# Patient Record
Sex: MALE | Race: ASIAN | Employment: OTHER | URBAN - METROPOLITAN AREA
[De-identification: names, ages, dates, MRNs, and addresses within clinical notes are randomized per-mention and may not be internally consistent; named-entity substitution may affect disease eponyms.]

---

## 2018-05-30 ENCOUNTER — HOSPITAL ENCOUNTER (OUTPATIENT)
Dept: LAB | Age: 83
Discharge: HOME | End: 2018-05-30

## 2018-05-30 PROCEDURE — 88305 TISSUE EXAM BY PATHOLOGIST: CPT | Performed by: INTERNAL MEDICINE

## 2018-10-16 PROBLEM — E11.21 TYPE 2 DIABETES WITH NEPHROPATHY (HCC): Status: ACTIVE | Noted: 2018-10-16

## 2018-12-19 ENCOUNTER — HOSPITAL ENCOUNTER (OUTPATIENT)
Dept: GENERAL RADIOLOGY | Age: 83
Discharge: HOME | End: 2018-12-19
Attending: NURSE PRACTITIONER
Payer: MEDICARE

## 2018-12-19 DIAGNOSIS — M54.50 LUMBAR PAIN: ICD-10-CM

## 2018-12-19 PROCEDURE — 72110 X-RAY EXAM L-2 SPINE 4/>VWS: CPT

## 2018-12-19 NOTE — PROGRESS NOTES
X-ray shows Moderate degenerative change without significant difference when compared to the prior exam of September 2014

## 2019-11-22 ENCOUNTER — HOSPITAL ENCOUNTER (OUTPATIENT)
Dept: MRI IMAGING | Age: 84
Discharge: HOME | End: 2019-11-22
Attending: PSYCHIATRY & NEUROLOGY
Payer: MEDICARE

## 2019-11-22 DIAGNOSIS — G91.2 NPH (NORMAL PRESSURE HYDROCEPHALUS) (HCC): ICD-10-CM

## 2019-11-22 DIAGNOSIS — M48.062 SPINAL STENOSIS OF LUMBAR REGION WITH NEUROGENIC CLAUDICATION: ICD-10-CM

## 2019-11-22 PROCEDURE — 70553 MRI BRAIN STEM W/O & W/DYE: CPT

## 2019-11-22 PROCEDURE — A9575 INJ GADOTERATE MEGLUMI 0.1ML: HCPCS | Performed by: PSYCHIATRY & NEUROLOGY

## 2019-11-22 PROCEDURE — 74011250636 HC RX REV CODE- 250/636: Performed by: PSYCHIATRY & NEUROLOGY

## 2019-11-22 PROCEDURE — 72148 MRI LUMBAR SPINE W/O DYE: CPT

## 2019-11-22 RX ORDER — GADOTERATE MEGLUMINE 376.9 MG/ML
15 INJECTION INTRAVENOUS
Status: COMPLETED | OUTPATIENT
Start: 2019-11-22 | End: 2019-11-22

## 2019-11-22 RX ORDER — SODIUM CHLORIDE 0.9 % (FLUSH) 0.9 %
10 SYRINGE (ML) INJECTION
Status: COMPLETED | OUTPATIENT
Start: 2019-11-22 | End: 2019-11-22

## 2019-11-22 RX ADMIN — GADOTERATE MEGLUMINE 15 ML: 376.9 INJECTION INTRAVENOUS at 16:08

## 2019-11-22 RX ADMIN — Medication 10 ML: at 16:07

## 2019-12-13 ENCOUNTER — HOSPITAL ENCOUNTER (OUTPATIENT)
Dept: CT IMAGING | Age: 84
Discharge: HOME | End: 2019-12-13
Attending: FAMILY MEDICINE
Payer: MEDICARE

## 2019-12-13 ENCOUNTER — HOSPITAL ENCOUNTER (OUTPATIENT)
Dept: GENERAL RADIOLOGY | Age: 84
Discharge: HOME | End: 2019-12-13
Attending: FAMILY MEDICINE
Payer: MEDICARE

## 2019-12-13 DIAGNOSIS — M54.50 ACUTE MIDLINE LOW BACK PAIN WITHOUT SCIATICA: ICD-10-CM

## 2019-12-13 DIAGNOSIS — S09.90XA INJURY OF HEAD, INITIAL ENCOUNTER: ICD-10-CM

## 2019-12-13 PROCEDURE — 72100 X-RAY EXAM L-S SPINE 2/3 VWS: CPT

## 2019-12-13 PROCEDURE — 70450 CT HEAD/BRAIN W/O DYE: CPT

## 2020-01-16 PROBLEM — M21.372 FOOT DROP, BILATERAL: Status: ACTIVE | Noted: 2020-01-16

## 2020-01-16 PROBLEM — M21.371 FOOT DROP, BILATERAL: Status: ACTIVE | Noted: 2020-01-16

## 2020-01-16 PROBLEM — M54.50 CHRONIC BILATERAL LOW BACK PAIN: Status: ACTIVE | Noted: 2020-01-16

## 2020-01-16 PROBLEM — R29.898 WEAKNESS OF BOTH LEGS: Status: ACTIVE | Noted: 2020-01-16

## 2020-01-16 PROBLEM — R20.2 NUMBNESS AND TINGLING OF BOTH FEET: Status: ACTIVE | Noted: 2020-01-16

## 2020-01-16 PROBLEM — G89.29 CHRONIC BILATERAL LOW BACK PAIN: Status: ACTIVE | Noted: 2020-01-16

## 2020-01-16 PROBLEM — R20.0 NUMBNESS AND TINGLING OF BOTH FEET: Status: ACTIVE | Noted: 2020-01-16

## 2020-01-26 ENCOUNTER — HOSPITAL ENCOUNTER (INPATIENT)
Age: 85
LOS: 1 days | Discharge: HOME HEALTH | DRG: 069 | End: 2020-01-28
Attending: EMERGENCY MEDICINE | Admitting: INTERNAL MEDICINE
Payer: MEDICARE

## 2020-01-26 ENCOUNTER — APPOINTMENT (OUTPATIENT)
Dept: CT IMAGING | Age: 85
DRG: 069 | End: 2020-01-26
Attending: EMERGENCY MEDICINE
Payer: MEDICARE

## 2020-01-26 DIAGNOSIS — G45.9 TIA (TRANSIENT ISCHEMIC ATTACK): Primary | ICD-10-CM

## 2020-01-26 PROBLEM — I25.10 CAD (CORONARY ARTERY DISEASE): Chronic | Status: ACTIVE | Noted: 2020-01-26

## 2020-01-26 PROBLEM — R41.3 MEMORY LOSS: Chronic | Status: ACTIVE | Noted: 2020-01-26

## 2020-01-26 LAB
ALBUMIN SERPL-MCNC: 3.4 G/DL (ref 3.2–4.6)
ALBUMIN/GLOB SERPL: 0.8 {RATIO} (ref 1.2–3.5)
ALP SERPL-CCNC: 77 U/L (ref 50–136)
ALT SERPL-CCNC: 27 U/L (ref 12–65)
ANION GAP SERPL CALC-SCNC: 2 MMOL/L (ref 7–16)
AST SERPL-CCNC: 21 U/L (ref 15–37)
ATRIAL RATE: 71 BPM
BASOPHILS # BLD: 0 K/UL (ref 0–0.2)
BASOPHILS NFR BLD: 0 % (ref 0–2)
BILIRUB SERPL-MCNC: 0.5 MG/DL (ref 0.2–1.1)
BUN SERPL-MCNC: 13 MG/DL (ref 8–23)
CALCIUM SERPL-MCNC: 9.7 MG/DL (ref 8.3–10.4)
CALCULATED P AXIS, ECG09: 50 DEGREES
CALCULATED R AXIS, ECG10: -8 DEGREES
CALCULATED T AXIS, ECG11: 46 DEGREES
CHLORIDE SERPL-SCNC: 105 MMOL/L (ref 98–107)
CO2 SERPL-SCNC: 30 MMOL/L (ref 21–32)
CREAT SERPL-MCNC: 0.93 MG/DL (ref 0.8–1.5)
CRP SERPL-MCNC: <0.3 MG/DL (ref 0–0.9)
DIAGNOSIS, 93000: NORMAL
DIFFERENTIAL METHOD BLD: ABNORMAL
EOSINOPHIL # BLD: 0.1 K/UL (ref 0–0.8)
EOSINOPHIL NFR BLD: 1 % (ref 0.5–7.8)
ERYTHROCYTE [DISTWIDTH] IN BLOOD BY AUTOMATED COUNT: 13.1 % (ref 11.9–14.6)
EST. AVERAGE GLUCOSE BLD GHB EST-MCNC: 151 MG/DL
GLOBULIN SER CALC-MCNC: 4.1 G/DL (ref 2.3–3.5)
GLUCOSE BLD STRIP.AUTO-MCNC: 202 MG/DL (ref 65–100)
GLUCOSE SERPL-MCNC: 176 MG/DL (ref 65–100)
HBA1C MFR BLD: 6.9 %
HCT VFR BLD AUTO: 43.6 % (ref 41.1–50.3)
HGB BLD-MCNC: 15.4 G/DL (ref 13.6–17.2)
IMM GRANULOCYTES # BLD AUTO: 0 K/UL (ref 0–0.5)
IMM GRANULOCYTES NFR BLD AUTO: 0 % (ref 0–5)
LYMPHOCYTES # BLD: 3.2 K/UL (ref 0.5–4.6)
LYMPHOCYTES NFR BLD: 36 % (ref 13–44)
MCH RBC QN AUTO: 30.9 PG (ref 26.1–32.9)
MCHC RBC AUTO-ENTMCNC: 35.3 G/DL (ref 31.4–35)
MCV RBC AUTO: 87.4 FL (ref 79.6–97.8)
MONOCYTES # BLD: 0.5 K/UL (ref 0.1–1.3)
MONOCYTES NFR BLD: 5 % (ref 4–12)
NEUTS SEG # BLD: 5.2 K/UL (ref 1.7–8.2)
NEUTS SEG NFR BLD: 58 % (ref 43–78)
NRBC # BLD: 0 K/UL (ref 0–0.2)
P-R INTERVAL, ECG05: 148 MS
PLATELET # BLD AUTO: 193 K/UL (ref 150–450)
PMV BLD AUTO: 10.5 FL (ref 9.4–12.3)
POTASSIUM SERPL-SCNC: 4.1 MMOL/L (ref 3.5–5.1)
PROT SERPL-MCNC: 7.5 G/DL (ref 6.3–8.2)
Q-T INTERVAL, ECG07: 426 MS
QRS DURATION, ECG06: 130 MS
QTC CALCULATION (BEZET), ECG08: 462 MS
RBC # BLD AUTO: 4.99 M/UL (ref 4.23–5.6)
SODIUM SERPL-SCNC: 137 MMOL/L (ref 136–145)
TROPONIN I BLD-MCNC: 0 NG/ML (ref 0.02–0.05)
VENTRICULAR RATE, ECG03: 71 BPM
WBC # BLD AUTO: 8.9 K/UL (ref 4.3–11.1)

## 2020-01-26 PROCEDURE — 99283 EMERGENCY DEPT VISIT LOW MDM: CPT

## 2020-01-26 PROCEDURE — 82962 GLUCOSE BLOOD TEST: CPT

## 2020-01-26 PROCEDURE — 81001 URINALYSIS AUTO W/SCOPE: CPT

## 2020-01-26 PROCEDURE — 80053 COMPREHEN METABOLIC PANEL: CPT

## 2020-01-26 PROCEDURE — 74011250637 HC RX REV CODE- 250/637: Performed by: INTERNAL MEDICINE

## 2020-01-26 PROCEDURE — 99218 HC RM OBSERVATION: CPT

## 2020-01-26 PROCEDURE — 84484 ASSAY OF TROPONIN QUANT: CPT

## 2020-01-26 PROCEDURE — 93005 ELECTROCARDIOGRAM TRACING: CPT | Performed by: EMERGENCY MEDICINE

## 2020-01-26 PROCEDURE — 81003 URINALYSIS AUTO W/O SCOPE: CPT

## 2020-01-26 PROCEDURE — 83036 HEMOGLOBIN GLYCOSYLATED A1C: CPT

## 2020-01-26 PROCEDURE — 70450 CT HEAD/BRAIN W/O DYE: CPT

## 2020-01-26 PROCEDURE — 86140 C-REACTIVE PROTEIN: CPT

## 2020-01-26 PROCEDURE — 85025 COMPLETE CBC W/AUTO DIFF WBC: CPT

## 2020-01-26 RX ORDER — LISINOPRIL 5 MG/1
2.5 TABLET ORAL DAILY
Status: DISCONTINUED | OUTPATIENT
Start: 2020-01-27 | End: 2020-01-28 | Stop reason: HOSPADM

## 2020-01-26 RX ORDER — MEMANTINE HYDROCHLORIDE 5 MG/1
10 TABLET ORAL 2 TIMES DAILY
Status: DISCONTINUED | OUTPATIENT
Start: 2020-01-26 | End: 2020-01-28 | Stop reason: HOSPADM

## 2020-01-26 RX ORDER — ONDANSETRON 2 MG/ML
4 INJECTION INTRAMUSCULAR; INTRAVENOUS
Status: DISCONTINUED | OUTPATIENT
Start: 2020-01-26 | End: 2020-01-28 | Stop reason: HOSPADM

## 2020-01-26 RX ORDER — ACETAMINOPHEN 325 MG/1
650 TABLET ORAL
Status: DISCONTINUED | OUTPATIENT
Start: 2020-01-26 | End: 2020-01-28 | Stop reason: HOSPADM

## 2020-01-26 RX ORDER — METOPROLOL SUCCINATE 25 MG/1
12.5 TABLET, EXTENDED RELEASE ORAL DAILY
Status: DISCONTINUED | OUTPATIENT
Start: 2020-01-27 | End: 2020-01-28 | Stop reason: HOSPADM

## 2020-01-26 RX ORDER — CLOPIDOGREL BISULFATE 75 MG/1
75 TABLET ORAL DAILY
Status: DISCONTINUED | OUTPATIENT
Start: 2020-01-27 | End: 2020-01-28 | Stop reason: HOSPADM

## 2020-01-26 RX ORDER — GUAIFENESIN 100 MG/5ML
81 LIQUID (ML) ORAL DAILY
Status: DISCONTINUED | OUTPATIENT
Start: 2020-01-27 | End: 2020-01-28 | Stop reason: HOSPADM

## 2020-01-26 RX ORDER — SODIUM CHLORIDE 0.9 % (FLUSH) 0.9 %
5-40 SYRINGE (ML) INJECTION AS NEEDED
Status: DISCONTINUED | OUTPATIENT
Start: 2020-01-26 | End: 2020-01-28 | Stop reason: HOSPADM

## 2020-01-26 RX ORDER — FOLIC ACID 1 MG/1
1 TABLET ORAL DAILY
Status: DISCONTINUED | OUTPATIENT
Start: 2020-01-27 | End: 2020-01-28 | Stop reason: HOSPADM

## 2020-01-26 RX ORDER — PANTOPRAZOLE SODIUM 40 MG/1
40 TABLET, DELAYED RELEASE ORAL
Status: DISCONTINUED | OUTPATIENT
Start: 2020-01-27 | End: 2020-01-28 | Stop reason: HOSPADM

## 2020-01-26 RX ORDER — SODIUM CHLORIDE 0.9 % (FLUSH) 0.9 %
5-40 SYRINGE (ML) INJECTION EVERY 8 HOURS
Status: DISCONTINUED | OUTPATIENT
Start: 2020-01-26 | End: 2020-01-28 | Stop reason: HOSPADM

## 2020-01-26 RX ORDER — INSULIN LISPRO 100 [IU]/ML
INJECTION, SOLUTION INTRAVENOUS; SUBCUTANEOUS
Status: DISCONTINUED | OUTPATIENT
Start: 2020-01-27 | End: 2020-01-28 | Stop reason: HOSPADM

## 2020-01-26 RX ORDER — ATORVASTATIN CALCIUM 40 MG/1
80 TABLET, FILM COATED ORAL DAILY
Status: DISCONTINUED | OUTPATIENT
Start: 2020-01-27 | End: 2020-01-28 | Stop reason: HOSPADM

## 2020-01-26 RX ADMIN — Medication 5 ML: at 22:39

## 2020-01-26 RX ADMIN — MEMANTINE 10 MG: 5 TABLET ORAL at 20:37

## 2020-01-26 NOTE — ED TRIAGE NOTES
Patient presents from home with family LKW 0000 today. Per patients family he has had a diffuse set of sx today which seem to be resolving. Patient was not able to use left hand as well at breakfast this morning, has been weaker on the left side than normal. Patient has hx of TIA within the past month.  Patient A&O x 3

## 2020-01-26 NOTE — ED NOTES
TRANSFER - OUT REPORT:    Verbal report given to Aurelio Peacock RN(name) on Vilma Sanchez  being transferred to 344(unit) for routine progression of care       Report consisted of patients Situation, Background, Assessment and   Recommendations(SBAR). Information from the following report(s) ED Summary was reveiwed with the receiving nurse. Opportunity for questions and clarification was provided.       Ischemic Stroke without Activase/TIA    BP Parameters: Less Than 220/120 for 24 hours, then consult MD for parameters    Controlled With: None    Dysphagia Screen Completed: No        NIH Stroke Scale Complete: Yes: 0    Frequency of Vital Signs: Every 2 hours    Frequency of Neuro Checks: Every 2 hours

## 2020-01-26 NOTE — ED NOTES
TRANSFER - OUT REPORT:    Verbal report given to Cori Bernal RN(name) on Kenzie Pitts  being transferred to 344(unit) for routine progression of care       Report consisted of patients Situation, Background, Assessment and   Recommendations(SBAR). Information from the following report(s) ED Summary was reviewed with the receiving nurse. Lines:   Peripheral IV 01/26/20 Right Antecubital (Active)   Site Assessment Clean, dry, & intact 1/26/2020  4:36 PM   Phlebitis Assessment 0 1/26/2020  4:36 PM   Infiltration Assessment 0 1/26/2020  4:36 PM   Dressing Status Clean, dry, & intact 1/26/2020  4:36 PM   Dressing Type Transparent 1/26/2020  4:36 PM   Hub Color/Line Status Pink;Flushed;Patent 1/26/2020  4:36 PM        Opportunity for questions and clarification was provided.       Patient transported with:   Registered Nurse   Monitor

## 2020-01-26 NOTE — PROGRESS NOTES
TRANSFER - IN REPORT:    Verbal report received from Wilda Shipley RN on Scooter Mabry  being received from [address] for routine progression of care      Report consisted of patients Situation, Background, Assessment and   Recommendations(SBAR). Information from the following report(s) SBAR, ED Summary and Recent Results was reviewed with the receiving nurse. Opportunity for questions and clarification was provided. Assessment to becompleted upon patients arrival to unit and care to be assumed.

## 2020-01-26 NOTE — H&P
Hospitalist H&P Note     Admit Date:  2020  4:18 PM   Name:  Janeen Antonio   Age:  81 y.o.  :  1935   MRN:  [id number]   PCP:  Sacha Shaw MD  Treatment Team: Attending Provider: Pliar Corea MD; Primary Nurse: Hallie San RN    HPI:     CC:  Weakness       Mr. Wyatt Mireles is an 80 yo male with PMH of CVA, CAD s/p CABG, DM2, HTN, HLP, bilateral foot drop and chronic lower back pain ambulating with braces, who is evaluated with weakness. Wife states that he had difficulty ambulating yesterday that is within his normal but became more confused and ataxic today. No syncope. Sleeping more, no pain, no fever. CT head negative. 10 systems reviewed and negative except as noted in HPI. - denies chest pain, dyspnea, anorexia, bowel changes       Past Medical History:   Diagnosis Date    Arthritis     knees/elbows    CAD (coronary artery disease)     CAD (coronary artery disease)     Contact dermatitis and other eczema, due to unspecified cause 2014    Contact dermatitis and other eczema, due to unspecified cause     Essential hypertension, benign 2014    GERD (gastroesophageal reflux disease)     GERD (gastroesophageal reflux disease)     HTN (hypertension)     Hypertension     Mixed hyperlipidemia 2014    Other and unspecified hyperlipidemia 2014    Personal history of colonic polyps 2014    Type II or unspecified type diabetes mellitus without mention of complication, not stated as uncontrolled 2014    Type II or unspecified type diabetes mellitus without mention of complication, not stated as uncontrolled       Past Surgical History:   Procedure Laterality Date    CARDIAC SURG PROCEDURE UNLIST      cabg x3    HX CATARACT REMOVAL      HX COLONOSCOPY  12    2017    HX HERNIA REPAIR Right     HX OTHER SURGICAL      herrhoidectomy      Allergies   Allergen Reactions    Lamisil [Terbinafine] Rash    Loracarbef Rash    Pcn [Penicillins] Rash    Tetracycline Rash      Social History     Tobacco Use    Smoking status: Never Smoker    Smokeless tobacco: Never Used   Substance Use Topics    Alcohol use: Yes     Comment: 1 drink/week      Family History   Problem Relation Age of Onset    Heart Disease Mother     Heart Disease Father       Immunization History   Administered Date(s) Administered    Hep A Vaccine 08/22/2008    Hep B Vaccine 03/07/2008, 07/28/2008    Influenza Vaccine 10/27/2014    Pneumococcal Conjugate (PCV-13) 04/16/2018    Pneumococcal Vaccine (Unspecified Type) 07/23/2014     PTA Medications:  Prior to Admission Medications   Prescriptions Last Dose Informant Patient Reported? Taking? Cholecalciferol, Vitamin D3, (VITAMIN D3) 1,000 unit cap   Yes No   Sig: Take  by mouth. DISABLED PLACARD (DISABLED PLACARD) DMV   No No   Sig: Apply to car   aspirin 81 mg chewable tablet   Yes No   Sig: Take 81 mg by mouth daily. atorvastatin (LIPITOR) 80 mg tablet   No No   Sig: Take 1 Tab by mouth daily. clopidogrel (PLAVIX) 75 mg tab   No No   Sig: Take 1 Tab by mouth daily. co-enzyme Q-10 (COQ-10) 100 mg capsule   Yes No   Sig: Take 100 mg by mouth daily. folic acid (FOLVITE) 1 mg tablet   Yes No   Sig: Take  by mouth daily. lisinopril (PRINIVIL, ZESTRIL) 2.5 mg tablet   No No   Sig: Take 1 Tab by mouth daily. memantine (NAMENDA) 10 mg tablet   No No   Sig: TAKE 1/2 TABLET BY MOUTH TWICE DAILY FOR 1 MONTH THEN 1 TABLET BY MOUTH TWICE DAILY   metFORMIN (GLUCOPHAGE) 500 mg tablet   No No   Sig: TAKE 1 TABLET BY MOUTH TWICE DAILY WITH MEALS   metoprolol succinate (TOPROL-XL) 25 mg XL tablet   No No   Sig: Take 0.5 Tabs by mouth daily. multivitamin (ONE A DAY) tablet   Yes No   Sig: Take 1 Tab by mouth daily. omeprazole (PRILOSEC) 20 mg capsule   No No   Sig: TAKE 1 CAPSULE BY MOUTH TWICE DAILY   pantoprazole (PROTONIX) 40 mg tablet   No No   Sig: Take 1 Tab by mouth daily.       Facility-Administered Medications: None       Objective:     Patient Vitals for the past 24 hrs:   Temp Resp BP SpO2   01/26/20 1545 97.6 °F (36.4 °C)      01/26/20 1536  18 159/82 96 %     Oxygen Therapy  O2 Sat (%): 96 % (01/26/20 1536)  O2 Device: Room air (01/26/20 1536)  No intake or output data in the 24 hours ending 01/26/20 1828    Physical Exam:  General:    Alert. No distress, elderly  Eyes:   Normal sclera. Extraocular movements intact. PERRLA  ENT:  Normocephalic, atraumatic. Moist mucous membranes  CV:   RRR. No m/r/g. No edema  Lungs:  CTAB. No wheezing, rhonchi, or rales. anterior  Abdomen: Soft, nontender, nondistended. Present BS  Extremities: Warm and dry. Generalized debility, LE braces  Neurologic:  grossly intact. Skin:     No rashes or jaundice. Normal coloration  Psych:  Normal mood and affect. I reviewed the labs, imaging, EKGs, telemetry, and other studies done this admission.     EKG personally reviewed as NSR with RBBB    Data Review:   Recent Results (from the past 24 hour(s))   EKG, 12 LEAD, INITIAL    Collection Time: 01/26/20  4:26 PM   Result Value Ref Range    Ventricular Rate 71 BPM    Atrial Rate 71 BPM    P-R Interval 148 ms    QRS Duration 130 ms    Q-T Interval 426 ms    QTC Calculation (Bezet) 462 ms    Calculated P Axis 50 degrees    Calculated R Axis -8 degrees    Calculated T Axis 46 degrees    Diagnosis       Sinus rhythm with Premature atrial complexes with Aberrant conduction  Possible Left atrial enlargement  Right bundle branch block  Abnormal ECG  No previous ECGs available  Confirmed by DIEGO INIGUEZ (), JOSHUA JONES ([id number]) on 1/26/2020 4:43:25 PM     CBC WITH AUTOMATED DIFF    Collection Time: 01/26/20  4:38 PM   Result Value Ref Range    WBC 8.9 4.3 - 11.1 K/uL    RBC 4.99 4.23 - 5.6 M/uL    HGB 15.4 13.6 - 17.2 g/dL    HCT 43.6 41.1 - 50.3 %    MCV 87.4 79.6 - 97.8 FL    MCH 30.9 26.1 - 32.9 PG    MCHC 35.3 (H) 31.4 - 35.0 g/dL    RDW 13.1 11.9 - 14.6 %    PLATELET 257 405 - 450 K/uL    MPV 10.5 9.4 - 12.3 FL    ABSOLUTE NRBC 0.00 0.0 - 0.2 K/uL    DF AUTOMATED      NEUTROPHILS 58 43 - 78 %    LYMPHOCYTES 36 13 - 44 %    MONOCYTES 5 4.0 - 12.0 %    EOSINOPHILS 1 0.5 - 7.8 %    BASOPHILS 0 0.0 - 2.0 %    IMMATURE GRANULOCYTES 0 0.0 - 5.0 %    ABS. NEUTROPHILS 5.2 1.7 - 8.2 K/UL    ABS. LYMPHOCYTES 3.2 0.5 - 4.6 K/UL    ABS. MONOCYTES 0.5 0.1 - 1.3 K/UL    ABS. EOSINOPHILS 0.1 0.0 - 0.8 K/UL    ABS. BASOPHILS 0.0 0.0 - 0.2 K/UL    ABS. IMM. GRANS. 0.0 0.0 - 0.5 K/UL   METABOLIC PANEL, COMPREHENSIVE    Collection Time: 01/26/20  4:38 PM   Result Value Ref Range    Sodium 137 136 - 145 mmol/L    Potassium 4.1 3.5 - 5.1 mmol/L    Chloride 105 98 - 107 mmol/L    CO2 30 21 - 32 mmol/L    Anion gap 2 (L) 7 - 16 mmol/L    Glucose 176 (H) 65 - 100 mg/dL    BUN 13 8 - 23 MG/DL    Creatinine 0.93 0.8 - 1.5 MG/DL    GFR est AA >60 >60 ml/min/1.73m2    GFR est non-AA >60 >60 ml/min/1.73m2    Calcium 9.7 8.3 - 10.4 MG/DL    Bilirubin, total 0.5 0.2 - 1.1 MG/DL    ALT (SGPT) 27 12 - 65 U/L    AST (SGOT) 21 15 - 37 U/L    Alk.  phosphatase 77 50 - 136 U/L    Protein, total 7.5 6.3 - 8.2 g/dL    Albumin 3.4 3.2 - 4.6 g/dL    Globulin 4.1 (H) 2.3 - 3.5 g/dL    A-G Ratio 0.8 (L) 1.2 - 3.5     HEMOGLOBIN A1C WITH EAG    Collection Time: 01/26/20  4:38 PM   Result Value Ref Range    Hemoglobin A1c 6.9 %    Est. average glucose 151 mg/dL   C REACTIVE PROTEIN, QT    Collection Time: 01/26/20  4:38 PM   Result Value Ref Range    C-Reactive protein <0.3 0.0 - 0.9 mg/dL   POC TROPONIN    Collection Time: 01/26/20  4:40 PM   Result Value Ref Range    Troponin-I (POC) 0 (L) 0.02 - 0.05 ng/ml       All Micro Results     None          Other Studies:  Ct Head Wo Cont    Result Date: 1/26/2020  CT of the head without contrast. CLINICAL INDICATION: Worsening left-sided weakness, unsteady gait PROCEDURE: Serial thin section axial images are obtained from the cranial vertex through the skull base without the administration of intravenous contrast. Radiation dose reduction techniques were used for this study. Our CT scanners use one or all of the following: Automated exposure control, adjusted of the mA and/or kV according to patient size, iterative reconstruction COMPARISON: Head CT dated 12/13/2019. FINDINGS: There is no acute intracranial hemorrhage, mass, or mass effect. No abnormal extra-axial fluid collections identified. There is no hydrocephalus. The basilar cisterns are widely patent. Stable, mild bilateral frontal lobe atrophy is appreciated. Encephalomalacia is noted in the right occipital lobe in keeping with prior CVA. The remainder the gray-white brain parenchymal interface is maintained. No skull fracture or aggressive osseous lesion noted. The mastoid air cells and included paranasal sinuses are clear. IMPRESSION: 1. No acute intracranial abnormality. 2. Sequela of old right occipital lobe CVA 3. Mild bilateral frontal lobe atrophy.        Assessment and Plan:     Hospital Problems as of 1/26/2020 Date Reviewed: 11/5/2019          Codes Class Noted - Resolved POA    * (Principal) TIA (transient ischemic attack) ICD-10-CM: G45.9  ICD-9-CM: 435.9  1/26/2020 - Present Yes        CAD (coronary artery disease) (Chronic) ICD-10-CM: I25.10  ICD-9-CM: 414.00  1/26/2020 - Present Yes        Memory loss (Chronic) ICD-10-CM: R41.3  ICD-9-CM: 780.93  1/26/2020 - Present Yes        Foot drop, bilateral ICD-10-CM: M21.371, M21.372  ICD-9-CM: 736.79  1/16/2020 - Present Yes        Essential hypertension, benign ICD-10-CM: I10  ICD-9-CM: 401.1  2/19/2014 - Present Yes        Mixed hyperlipidemia ICD-10-CM: E78.2  ICD-9-CM: 272.2  2/19/2014 - Present Yes              · Weakness: possible TIA but seems very generalized, admit to remote tele observation, proceed with MRI brain and ECHO (not done prior), with carotid duplex, PT/OT/SLP consults, continued asa/plavix/statin and antihypertensives, check UA  · DM2: hold glucophage, add SSI  · HTN: resume home meds   · Dementia: continued namenda  · Foot drop: PT/OT  · CAD: continued asa/statin/plavix    Discharge planning:  pending  DVT ppx: SCD  Code status:  Full  Estimated LOS:  Greater than 2 midnights  Risk:  high  Care plan: wife Wilfred Zimmer [phone]  Signed:  Kvng Perez MD

## 2020-01-26 NOTE — ED PROVIDER NOTES
Called to see patient in triage. Patient presents with family with concern over left-sided weakness. Family has a history of previous TIA with some residual left-sided weakness. Family reports yesterday afternoon he had a spell while sitting where he seemed to be leaning more to the left side, since then symptoms has persisted. They feel as if he has more focal weakness on his left side and does have some difficulty with his speech. Upon examination some weakness in the left upper and lower extremity noted. Given onset of symptoms possibly starting yesterday and I feel he is a code stroke, difficult to ascertain degree of new weakness given history. Over the last plan for stat CT scan of head and basic labs. Triage examination and history was performed by me, further examination and history to be performed by other provider. Medhat Diaz MD      The history is provided by the patient, the spouse and medical records. Fatigue   This is a new problem. The current episode started yesterday. The problem has not changed since onset. There was left upper extremity and left lower extremity (Patient has been noted to be leaning and or falling towards the left since yesterday as well as having difficulty using his left hand and upper extremity for eating.) focality noted. Primary symptoms include focal weakness, loss of balance and movement disorder. Pertinent negatives include no loss of sensation, no slurred speech, no speech difficulty, no memory loss, no agitation, no visual change, no auditory change, no mental status change, no unresponsiveness and no disorientation. There has been no fever. Pertinent negatives include no shortness of breath, no vomiting, no altered mental status, no confusion, no headaches, no choking, no nausea, no bowel incontinence and no bladder incontinence.  There were no medications administered prior to arrival.        Past Medical History:   Diagnosis Date    Arthritis     knees/elbows  CAD (coronary artery disease)     CAD (coronary artery disease)     Contact dermatitis and other eczema, due to unspecified cause 2/19/2014    Contact dermatitis and other eczema, due to unspecified cause     Essential hypertension, benign 2/19/2014    GERD (gastroesophageal reflux disease)     GERD (gastroesophageal reflux disease)     HTN (hypertension)     Hypertension     Mixed hyperlipidemia 2/19/2014    Other and unspecified hyperlipidemia 2/19/2014    Personal history of colonic polyps 2/19/2014    Type II or unspecified type diabetes mellitus without mention of complication, not stated as uncontrolled 2/19/2014    Type II or unspecified type diabetes mellitus without mention of complication, not stated as uncontrolled        Past Surgical History:   Procedure Laterality Date    CARDIAC SURG PROCEDURE UNLIST  1992    cabg x3    HX CATARACT REMOVAL      HX COLONOSCOPY  11/05/12 2017    HX HERNIA REPAIR Right     HX OTHER SURGICAL      herrhoidectomy         Family History:   Problem Relation Age of Onset    Heart Disease Mother     Heart Disease Father        Social History     Socioeconomic History    Marital status:      Spouse name: Not on file    Number of children: Not on file    Years of education: Not on file    Highest education level: Not on file   Occupational History    Not on file   Social Needs    Financial resource strain: Not on file    Food insecurity:     Worry: Not on file     Inability: Not on file    Transportation needs:     Medical: Not on file     Non-medical: Not on file   Tobacco Use    Smoking status: Never Smoker    Smokeless tobacco: Never Used   Substance and Sexual Activity    Alcohol use: Yes     Comment: 1 drink/week    Drug use: No    Sexual activity: Not on file   Lifestyle    Physical activity:     Days per week: Not on file     Minutes per session: Not on file    Stress: Not on file   Relationships    Social connections: Talks on phone: Not on file     Gets together: Not on file     Attends [employer] service: Not on file     Active member of club or organization: Not on file     Attends meetings of clubs or organizations: Not on file     Relationship status: Not on file    Intimate partner violence:     Fear of current or ex partner: Not on file     Emotionally abused: Not on file     Physically abused: Not on file     Forced sexual activity: Not on file   Other Topics Concern    Not on file   Social History Narrative    Not on file         ALLERGIES: Lamisil [terbinafine]; Loracarbef; Pcn [penicillins]; and Tetracycline    Review of Systems   Constitutional: Positive for fatigue. Respiratory: Negative for choking and shortness of breath. Gastrointestinal: Negative for bowel incontinence, nausea and vomiting. Genitourinary: Negative for bladder incontinence. Neurological: Positive for focal weakness and loss of balance. Negative for speech difficulty and headaches. Psychiatric/Behavioral: Negative for agitation, confusion and memory loss. All other systems reviewed and are negative. Vitals:    01/26/20 1536 01/26/20 1545   BP: 159/82    Resp: 18    Temp:  97.6 °F (36.4 °C)   SpO2: 96%    Weight: 74.8 kg (165 lb)    Height: 5' 6\" (1.676 m)             Physical Exam  Vitals signs reviewed. Constitutional:       Appearance: Normal appearance. HENT:      Head: Normocephalic and atraumatic. Nose: Nose normal.      Mouth/Throat:      Mouth: Mucous membranes are dry. Eyes:      Extraocular Movements: Extraocular movements intact. Conjunctiva/sclera: Conjunctivae normal.      Pupils: Pupils are equal, round, and reactive to light. Neck:      Musculoskeletal: Normal range of motion and neck supple. Cardiovascular:      Rate and Rhythm: Normal rate and regular rhythm. Pulses: Normal pulses. Heart sounds: Normal heart sounds.    Pulmonary:      Effort: Pulmonary effort is normal.      Breath sounds: Normal breath sounds. Abdominal:      General: Abdomen is flat. There is no distension. Tenderness: There is no tenderness. Musculoskeletal: Normal range of motion. Skin:     General: Skin is warm and dry. Capillary Refill: Capillary refill takes less than 2 seconds. Neurological:      Mental Status: He is alert and oriented to person, place, and time. Mental status is at baseline. Cranial Nerves: No cranial nerve deficit. Sensory: No sensory deficit. Motor: Weakness present. Coordination: Coordination normal.      Gait: Gait abnormal.      Deep Tendon Reflexes: Reflexes normal.      Comments: Patient exhibits some flexure of the fingers of the left hand and appears unable to completely extend the fingers. He has pre-diagnosed bilateral foot drop. Psychiatric:         Mood and Affect: Mood normal.         Behavior: Behavior normal.          MDM  Number of Diagnoses or Management Options  Diagnosis management comments: Patient has been seen in the neurology office on 2 occasions within the past 3 weeks. 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        Amount and/or Complexity of Data Reviewed  Clinical lab tests: ordered and reviewed  Tests in the radiology section of CPT®: ordered and reviewed  Review and summarize past medical records: yes  Discuss the patient with other providers: yes  Independent visualization of images, tracings, or specimens: yes    Risk of Complications, Morbidity, and/or Mortality  Presenting problems: high  Diagnostic procedures: moderate  Management options: moderate    Patient Progress  Patient progress: stable         Procedures

## 2020-01-27 ENCOUNTER — APPOINTMENT (OUTPATIENT)
Dept: ULTRASOUND IMAGING | Age: 85
DRG: 069 | End: 2020-01-27
Attending: INTERNAL MEDICINE
Payer: MEDICARE

## 2020-01-27 ENCOUNTER — APPOINTMENT (OUTPATIENT)
Dept: MRI IMAGING | Age: 85
DRG: 069 | End: 2020-01-27
Attending: INTERNAL MEDICINE
Payer: MEDICARE

## 2020-01-27 ENCOUNTER — HOME HEALTH ADMISSION (OUTPATIENT)
Dept: HOME HEALTH SERVICES | Facility: HOME HEALTH | Age: 85
End: 2020-01-27
Payer: MEDICARE

## 2020-01-27 ENCOUNTER — APPOINTMENT (OUTPATIENT)
Dept: CT IMAGING | Age: 85
DRG: 069 | End: 2020-01-27
Attending: INTERNAL MEDICINE
Payer: MEDICARE

## 2020-01-27 PROBLEM — I63.9 CVA (CEREBRAL VASCULAR ACCIDENT) (HCC): Status: ACTIVE | Noted: 2020-01-27

## 2020-01-27 LAB
APPEARANCE UR: ABNORMAL
BACTERIA URNS QL MICRO: 0 /HPF
BILIRUB UR QL: NEGATIVE
CASTS URNS QL MICRO: ABNORMAL /LPF
CHOLEST SERPL-MCNC: 258 MG/DL
COLOR UR: YELLOW
EPI CELLS #/AREA URNS HPF: 0 /HPF
EST. AVERAGE GLUCOSE BLD GHB EST-MCNC: 146 MG/DL
GLUCOSE BLD STRIP.AUTO-MCNC: 146 MG/DL (ref 65–100)
GLUCOSE BLD STRIP.AUTO-MCNC: 154 MG/DL (ref 65–100)
GLUCOSE BLD STRIP.AUTO-MCNC: 169 MG/DL (ref 65–100)
GLUCOSE BLD STRIP.AUTO-MCNC: 266 MG/DL (ref 65–100)
GLUCOSE UR STRIP.AUTO-MCNC: 250 MG/DL
HBA1C MFR BLD: 6.7 %
HDLC SERPL-MCNC: 43 MG/DL (ref 40–60)
HDLC SERPL: 6 {RATIO}
HGB UR QL STRIP: NEGATIVE
KETONES UR QL STRIP.AUTO: NEGATIVE MG/DL
LDLC SERPL CALC-MCNC: 185 MG/DL
LEUKOCYTE ESTERASE UR QL STRIP.AUTO: NEGATIVE
LIPID PROFILE,FLP: ABNORMAL
NITRITE UR QL STRIP.AUTO: NEGATIVE
PH UR STRIP: 6.5 [PH] (ref 5–9)
PROT UR STRIP-MCNC: 30 MG/DL
RBC #/AREA URNS HPF: ABNORMAL /HPF
SP GR UR REFRACTOMETRY: 1.02 (ref 1–1.02)
TRIGL SERPL-MCNC: 150 MG/DL (ref 35–150)
UROBILINOGEN UR QL STRIP.AUTO: 0.2 EU/DL (ref 0.2–1)
VLDLC SERPL CALC-MCNC: 30 MG/DL (ref 6–23)
WBC URNS QL MICRO: 0 /HPF

## 2020-01-27 PROCEDURE — 99218 HC RM OBSERVATION: CPT

## 2020-01-27 PROCEDURE — 74011000302 HC RX REV CODE- 302: Performed by: INTERNAL MEDICINE

## 2020-01-27 PROCEDURE — 82962 GLUCOSE BLOOD TEST: CPT

## 2020-01-27 PROCEDURE — 70496 CT ANGIOGRAPHY HEAD: CPT

## 2020-01-27 PROCEDURE — 74011636637 HC RX REV CODE- 636/637: Performed by: INTERNAL MEDICINE

## 2020-01-27 PROCEDURE — 65660000000 HC RM CCU STEPDOWN

## 2020-01-27 PROCEDURE — 77030040361 HC SLV COMPR DVT MDII -B

## 2020-01-27 PROCEDURE — 97110 THERAPEUTIC EXERCISES: CPT

## 2020-01-27 PROCEDURE — 74011250637 HC RX REV CODE- 250/637: Performed by: INTERNAL MEDICINE

## 2020-01-27 PROCEDURE — 92610 EVALUATE SWALLOWING FUNCTION: CPT

## 2020-01-27 PROCEDURE — C8929 TTE W OR WO FOL WCON,DOPPLER: HCPCS

## 2020-01-27 PROCEDURE — 80061 LIPID PANEL: CPT

## 2020-01-27 PROCEDURE — 97163 PT EVAL HIGH COMPLEX 45 MIN: CPT

## 2020-01-27 PROCEDURE — 97535 SELF CARE MNGMENT TRAINING: CPT

## 2020-01-27 PROCEDURE — 74011250636 HC RX REV CODE- 250/636: Performed by: INTERNAL MEDICINE

## 2020-01-27 PROCEDURE — 93880 EXTRACRANIAL BILAT STUDY: CPT

## 2020-01-27 PROCEDURE — 83036 HEMOGLOBIN GLYCOSYLATED A1C: CPT

## 2020-01-27 PROCEDURE — 36415 COLL VENOUS BLD VENIPUNCTURE: CPT

## 2020-01-27 PROCEDURE — 86580 TB INTRADERMAL TEST: CPT | Performed by: INTERNAL MEDICINE

## 2020-01-27 PROCEDURE — 74011000258 HC RX REV CODE- 258: Performed by: INTERNAL MEDICINE

## 2020-01-27 PROCEDURE — 97165 OT EVAL LOW COMPLEX 30 MIN: CPT

## 2020-01-27 PROCEDURE — 93005 ELECTROCARDIOGRAM TRACING: CPT | Performed by: INTERNAL MEDICINE

## 2020-01-27 PROCEDURE — 74011000250 HC RX REV CODE- 250: Performed by: INTERNAL MEDICINE

## 2020-01-27 PROCEDURE — 70551 MRI BRAIN STEM W/O DYE: CPT

## 2020-01-27 PROCEDURE — 74011636320 HC RX REV CODE- 636/320: Performed by: INTERNAL MEDICINE

## 2020-01-27 RX ORDER — SODIUM CHLORIDE 0.9 % (FLUSH) 0.9 %
10 SYRINGE (ML) INJECTION
Status: COMPLETED | OUTPATIENT
Start: 2020-01-27 | End: 2020-01-27

## 2020-01-27 RX ADMIN — FOLIC ACID 1 MG: 1 TABLET ORAL at 09:40

## 2020-01-27 RX ADMIN — ASPIRIN 81 MG 81 MG: 81 TABLET ORAL at 09:39

## 2020-01-27 RX ADMIN — METOPROLOL SUCCINATE 12.5 MG: 25 TABLET, EXTENDED RELEASE ORAL at 09:40

## 2020-01-27 RX ADMIN — INSULIN LISPRO 6 UNITS: 100 INJECTION, SOLUTION INTRAVENOUS; SUBCUTANEOUS at 11:30

## 2020-01-27 RX ADMIN — ATORVASTATIN CALCIUM 80 MG: 40 TABLET, FILM COATED ORAL at 09:40

## 2020-01-27 RX ADMIN — INSULIN LISPRO 2 UNITS: 100 INJECTION, SOLUTION INTRAVENOUS; SUBCUTANEOUS at 17:37

## 2020-01-27 RX ADMIN — CLOPIDOGREL BISULFATE 75 MG: 75 TABLET, FILM COATED ORAL at 09:40

## 2020-01-27 RX ADMIN — SODIUM CHLORIDE 100 ML: 900 INJECTION, SOLUTION INTRAVENOUS at 16:53

## 2020-01-27 RX ADMIN — TUBERCULIN PURIFIED PROTEIN DERIVATIVE 5 UNITS: 5 INJECTION, SOLUTION INTRADERMAL at 17:40

## 2020-01-27 RX ADMIN — PANTOPRAZOLE SODIUM 40 MG: 40 TABLET, DELAYED RELEASE ORAL at 09:39

## 2020-01-27 RX ADMIN — MEMANTINE 10 MG: 5 TABLET ORAL at 09:40

## 2020-01-27 RX ADMIN — MEMANTINE 10 MG: 5 TABLET ORAL at 20:52

## 2020-01-27 RX ADMIN — Medication 5 ML: at 07:39

## 2020-01-27 RX ADMIN — Medication 10 ML: at 16:52

## 2020-01-27 RX ADMIN — PERFLUTREN 1 ML: 6.52 INJECTION, SUSPENSION INTRAVENOUS at 14:00

## 2020-01-27 RX ADMIN — IOPAMIDOL 80 ML: 755 INJECTION, SOLUTION INTRAVENOUS at 16:52

## 2020-01-27 RX ADMIN — INSULIN LISPRO 4 UNITS: 100 INJECTION, SOLUTION INTRAVENOUS; SUBCUTANEOUS at 00:06

## 2020-01-27 RX ADMIN — LISINOPRIL 2.5 MG: 5 TABLET ORAL at 09:40

## 2020-01-27 NOTE — PROGRESS NOTES
Hospitalist Note     Admit Date:  2020  4:18 PM   Name:  Sonia Parsons   Age:  81 y.o.  :  1935   MRN:  [id number]   PCP:  Harry Nazario MD  Treatment Team: Attending Provider: Amilcar Garcia MD; Physical Therapist: Dion Lea PT; Utilization Review: Dina Lambert; : HAYLEE Zavala    HPI/Subjective:       Mr. Princess Cantu is an 80 yo male with PMH of CVA, CAD s/p CABG, DM2, HTN, HLP, bilateral foot drop and chronic lower back pain ambulating with braces, who is evaluated with weakness. Wife states that he had difficulty ambulating  that is within his normal but became more confused and ataxic on admit. UA negative. CT head negative. MRI brain/ ECHO pending. Carotid US no acute stenosis. Discharge plans pending to home with wife        20 ate ok, feels within his normal, fell overnight in bathroom, still weak with ambulation, no pain, no dyspnea or cough      Objective:     Patient Vitals for the past 24 hrs:   Temp Pulse Resp BP SpO2   20 1213 98.4 °F (36.9 °C) 64 18 108/65 97 %   20 0948    125/74    20 0831 97.2 °F (36.2 °C) 70 18  97 %   20 0330 97.9 °F (36.6 °C) 67 28 (!) 167/99 99 %   20 2330 96.7 °F (35.9 °C) 67 28 138/69 97 %   20 1930 97.1 °F (36.2 °C) 65 (!) 32 168/88 98 %   20 1545 97.6 °F (36.4 °C)       20 1536   18 159/82 96 %     Oxygen Therapy  O2 Sat (%): 97 % (20 1213)  O2 Device: Room air (20 1536)    Estimated body mass index is 26.63 kg/m² as calculated from the following:    Height as of this encounter: 5' 6\" (1.676 m). Weight as of this encounter: 74.8 kg (165 lb).       Intake/Output Summary (Last 24 hours) at 2020 1415  Last data filed at 2020 2300  Gross per 24 hour   Intake    Output 125 ml   Net -125 ml       *Note that automatically entered I/Os may not be accurate; dependent on patient compliance with collection and accurate  by techs.    General:    Well nourished. Alert. No distress   CV:   RRR. No murmur, rub, or gallop. No edema  Lungs:   CTAB. No wheezing, rhonchi, or rales. anterior  Abdomen:   Soft, nontender, nondistended. decreased BS. Extremities: Warm and dry. No cyanosis or edema  Skin:     No rashes or jaundice. Neuro:  No gross focal deficits    Data Review:  I have reviewed all labs, meds, and studies from the last 24 hours:    Recent Results (from the past 24 hour(s))   EKG, 12 LEAD, INITIAL    Collection Time: 01/26/20  4:26 PM   Result Value Ref Range    Ventricular Rate 71 BPM    Atrial Rate 71 BPM    P-R Interval 148 ms    QRS Duration 130 ms    Q-T Interval 426 ms    QTC Calculation (Bezet) 462 ms    Calculated P Axis 50 degrees    Calculated R Axis -8 degrees    Calculated T Axis 46 degrees    Diagnosis       Sinus rhythm with Premature atrial complexes with Aberrant conduction  Possible Left atrial enlargement  Right bundle branch block  Abnormal ECG  No previous ECGs available  Confirmed by DIEGO INIGUEZ (), Evangelist Mabry ([id number]) on 1/26/2020 4:43:25 PM     CBC WITH AUTOMATED DIFF    Collection Time: 01/26/20  4:38 PM   Result Value Ref Range    WBC 8.9 4.3 - 11.1 K/uL    RBC 4.99 4.23 - 5.6 M/uL    HGB 15.4 13.6 - 17.2 g/dL    HCT 43.6 41.1 - 50.3 %    MCV 87.4 79.6 - 97.8 FL    MCH 30.9 26.1 - 32.9 PG    MCHC 35.3 (H) 31.4 - 35.0 g/dL    RDW 13.1 11.9 - 14.6 %    PLATELET 855 334 - 272 K/uL    MPV 10.5 9.4 - 12.3 FL    ABSOLUTE NRBC 0.00 0.0 - 0.2 K/uL    DF AUTOMATED      NEUTROPHILS 58 43 - 78 %    LYMPHOCYTES 36 13 - 44 %    MONOCYTES 5 4.0 - 12.0 %    EOSINOPHILS 1 0.5 - 7.8 %    BASOPHILS 0 0.0 - 2.0 %    IMMATURE GRANULOCYTES 0 0.0 - 5.0 %    ABS. NEUTROPHILS 5.2 1.7 - 8.2 K/UL    ABS. LYMPHOCYTES 3.2 0.5 - 4.6 K/UL    ABS. MONOCYTES 0.5 0.1 - 1.3 K/UL    ABS. EOSINOPHILS 0.1 0.0 - 0.8 K/UL    ABS. BASOPHILS 0.0 0.0 - 0.2 K/UL    ABS. IMM.  GRANS. 0.0 0.0 - 0.5 K/UL   METABOLIC PANEL, COMPREHENSIVE Collection Time: 01/26/20  4:38 PM   Result Value Ref Range    Sodium 137 136 - 145 mmol/L    Potassium 4.1 3.5 - 5.1 mmol/L    Chloride 105 98 - 107 mmol/L    CO2 30 21 - 32 mmol/L    Anion gap 2 (L) 7 - 16 mmol/L    Glucose 176 (H) 65 - 100 mg/dL    BUN 13 8 - 23 MG/DL    Creatinine 0.93 0.8 - 1.5 MG/DL    GFR est AA >60 >60 ml/min/1.73m2    GFR est non-AA >60 >60 ml/min/1.73m2    Calcium 9.7 8.3 - 10.4 MG/DL    Bilirubin, total 0.5 0.2 - 1.1 MG/DL    ALT (SGPT) 27 12 - 65 U/L    AST (SGOT) 21 15 - 37 U/L    Alk.  phosphatase 77 50 - 136 U/L    Protein, total 7.5 6.3 - 8.2 g/dL    Albumin 3.4 3.2 - 4.6 g/dL    Globulin 4.1 (H) 2.3 - 3.5 g/dL    A-G Ratio 0.8 (L) 1.2 - 3.5     HEMOGLOBIN A1C WITH EAG    Collection Time: 01/26/20  4:38 PM   Result Value Ref Range    Hemoglobin A1c 6.9 %    Est. average glucose 151 mg/dL   C REACTIVE PROTEIN, QT    Collection Time: 01/26/20  4:38 PM   Result Value Ref Range    C-Reactive protein <0.3 0.0 - 0.9 mg/dL   POC TROPONIN    Collection Time: 01/26/20  4:40 PM   Result Value Ref Range    Troponin-I (POC) 0 (L) 0.02 - 0.05 ng/ml   GLUCOSE, POC    Collection Time: 01/26/20 10:38 PM   Result Value Ref Range    Glucose (POC) 202 (H) 65 - 100 mg/dL   URINALYSIS W/ RFLX MICROSCOPIC    Collection Time: 01/26/20 11:32 PM   Result Value Ref Range    Color YELLOW      Appearance CLOUDY      Specific gravity 1.017 1.001 - 1.023      pH (UA) 6.5 5.0 - 9.0      Protein 30 (A) NEG mg/dL    Glucose 250 mg/dL    Ketone NEGATIVE  NEG mg/dL    Bilirubin NEGATIVE  NEG      Blood NEGATIVE  NEG      Urobilinogen 0.2 0.2 - 1.0 EU/dL    Nitrites NEGATIVE  NEG      Leukocyte Esterase NEGATIVE  NEG      WBC 0 0 /hpf    RBC 0-3 0 /hpf    Epithelial cells 0 0 /hpf    Bacteria 0 0 /hpf    Casts 0-3 0 /lpf   LIPID PANEL    Collection Time: 01/27/20  6:25 AM   Result Value Ref Range    LIPID PROFILE          Cholesterol, total 258 MG/DL    Triglyceride 150 35 - 150 MG/DL    HDL Cholesterol 43 40 - 60 MG/DL LDL, calculated 185 (H) <100 MG/DL    VLDL, calculated 30 (H) 6.0 - 23.0 MG/DL    CHOL/HDL Ratio 6.0 <200     HEMOGLOBIN A1C WITH EAG    Collection Time: 01/27/20  6:25 AM   Result Value Ref Range    Hemoglobin A1c 6.7 %    Est. average glucose 146 mg/dL   GLUCOSE, POC    Collection Time: 01/27/20  7:41 AM   Result Value Ref Range    Glucose (POC) 146 (H) 65 - 100 mg/dL   GLUCOSE, POC    Collection Time: 01/27/20 11:30 AM   Result Value Ref Range    Glucose (POC) 266 (H) 65 - 100 mg/dL        All Micro Results     None          Current Meds:  Current Facility-Administered Medications   Medication Dose Route Frequency    aspirin chewable tablet 81 mg  81 mg Oral DAILY    atorvastatin (LIPITOR) tablet 80 mg  80 mg Oral DAILY    clopidogreL (PLAVIX) tablet 75 mg  75 mg Oral DAILY    folic acid (FOLVITE) tablet 1 mg  1 mg Oral DAILY    lisinopril (PRINIVIL, ZESTRIL) tablet 2.5 mg  2.5 mg Oral DAILY    memantine (NAMENDA) tablet 10 mg  10 mg Oral BID    metoprolol succinate (TOPROL-XL) XL tablet 12.5 mg  12.5 mg Oral DAILY    pantoprazole (PROTONIX) tablet 40 mg  40 mg Oral ACB    sodium chloride (NS) flush 5-40 mL  5-40 mL IntraVENous Q8H    sodium chloride (NS) flush 5-40 mL  5-40 mL IntraVENous PRN    ondansetron (ZOFRAN) injection 4 mg  4 mg IntraVENous Q6H PRN    acetaminophen (TYLENOL) tablet 650 mg  650 mg Oral Q6H PRN    insulin lispro (HUMALOG) injection   SubCUTAneous TIDAC       Other Studies:  No results found for this visit on 01/26/20. Ct Head Wo Cont    Result Date: 1/26/2020  CT of the head without contrast. CLINICAL INDICATION: Worsening left-sided weakness, unsteady gait PROCEDURE: Serial thin section axial images are obtained from the cranial vertex through the skull base without the administration of intravenous contrast. Radiation dose reduction techniques were used for this study.  Our CT scanners use one or all of the following: Automated exposure control, adjusted of the mA and/or kV according to patient size, iterative reconstruction COMPARISON: Head CT dated 12/13/2019. FINDINGS: There is no acute intracranial hemorrhage, mass, or mass effect. No abnormal extra-axial fluid collections identified. There is no hydrocephalus. The basilar cisterns are widely patent. Stable, mild bilateral frontal lobe atrophy is appreciated. Encephalomalacia is noted in the right occipital lobe in keeping with prior CVA. The remainder the gray-white brain parenchymal interface is maintained. No skull fracture or aggressive osseous lesion noted. The mastoid air cells and included paranasal sinuses are clear. IMPRESSION: 1. No acute intracranial abnormality. 2. Sequela of old right occipital lobe CVA 3. Mild bilateral frontal lobe atrophy. Duplex Carotid Bilateral    Result Date: 1/27/2020  TITLE:  Carotid and Vertebral Ultrasound Examination. INDICATION:  TIAs. Coronary artery disease. TECHNIQUE: Grayscale, Color Doppler, and Spectral Doppler Ultrasound interrogation performed. Peak Systolic Velocity, ICA-to-CCA ratio, and End-Diastolic Velocity are recorded. The estimate of stenosis is inferred from velocity measurements cross referenced to published correlations as defined by the Society of Radiologists in [address] [place] [address] Drive Radiology 2003; 229; 340-346. COMPARISON: None. RIGHT NECK:  The peak systolic velocity in the Common Carotid Artery = 62 cm/sec; Internal Carotid Artery = 75 cm/sec. Ratio = 1.2. Antegrade flow in the vertebral artery. LEFT  NECK:  The peak systolic velocity in the Common Carotid Artery = 80 cm/sec; Internal Carotid Artery = 80 cm/sec. Ratio = 1.0. Antegrade flow in the vertebral artery. IMPRESSION:  ZULEYMA:  No significant stenosis. LICA:  No significant stenosis.       Assessment and Plan:     Hospital Problems as of 1/27/2020 Date Reviewed: 11/5/2019          Codes Class Noted - Resolved POA    * (Principal) TIA (transient ischemic attack) ICD-10-CM: G45.9  ICD-9-CM: 435.9  1/26/2020 - Present Yes        CAD (coronary artery disease) (Chronic) ICD-10-CM: I25.10  ICD-9-CM: 414.00  1/26/2020 - Present Yes        Memory loss (Chronic) ICD-10-CM: R41.3  ICD-9-CM: 780.93  1/26/2020 - Present Yes        Foot drop, bilateral ICD-10-CM: M21.371, M21.372  ICD-9-CM: 736.79  1/16/2020 - Present Yes        Essential hypertension, benign ICD-10-CM: I10  ICD-9-CM: 401.1  2/19/2014 - Present Yes        Mixed hyperlipidemia ICD-10-CM: E78.2  ICD-9-CM: 272.2  2/19/2014 - Present Yes              Plan:    · Weakness: possible TIA but seems very generalized, pending MRI brain and ECHO (not done prior), PT/OT/SLP consults, continued asa/plavix/statin and antihypertensives  · DM2: holding glucophage, added SSI  · HTN: resumed home meds   · Dementia: continued namenda  · Foot drop: PT/OT  · CAD: continued asa/statin/plavix     Discharge planning:  pending to home   DVT ppx: SCD  Code status:  Full  Estimated LOS:  Greater than 2 midnights  Risk:  high  Care plan: wife Schuyler Zhou [phone]  Signed:  Miguel Alarcon MD

## 2020-01-27 NOTE — PROGRESS NOTES
SPEECH PATHOLOGY NOTE:    Speech therapy consult received and appreciated. Attempted to see patient this AM for bedside swallow evaluation. Patient is currently waiting on RN to provide diabetes medication prior to PO. Will re-attempt at later time/date as patient is able to participate and as schedule permits.       Marika Candelaria MS, CCC-SLP

## 2020-01-27 NOTE — PROGRESS NOTES
Dr. Erasmo Alexander notified of pt's unwitnessed fall during family members attempt to asst pt to bathroom, with leg braces offf, left pt in bathroom, then reported pt fell when trying to stand after toileting. Pt assessed with no signs of injury, denies any pain, family reports pt hit trash can, pt denies hitting head, reports he landed on his bottom. No apparent injury noted.

## 2020-01-27 NOTE — PROGRESS NOTES
Incontinent of urine with care given. Repositioned self, call light in reach, bed alarm set, shown how to order breakfast meal. Door open. Family expected to return this morning. Contact information on dry erase communication board.

## 2020-01-27 NOTE — PROGRESS NOTES
[address]  Face to Face Encounter    Patients Name: Mayra Gutierrez    YOB: 1935    Ordering Physician:  Yaa Estrella    Primary Diagnosis: TIA (transient ischemic attack) [G45.9]    Date of Face to Face:   1/27/2020                                  Face to Face Encounter findings are related to primary reason for home care:   yes. 1. I certify that the patient needs intermittent care as follows: physical therapy: strengthening and gait/stair training  occupational therapy:  ADL safety (ie. cooking, bathing, dressing)    2. I certify that this patient is homebound, that is: 1) patient requires the use of a walker device, special transportation, or assistance of another to leave the home; or 2) patient's condition makes leaving the home medically contraindicated; and 3) patient has a normal inability to leave the home and leaving the home requires considerable and taxing effort. Patient may leave the home for infrequent and short duration for medical reasons, and occasional absences for non-medical reasons. Homebound status is due to the following functional limitations: Patient with strength deficits limiting the performance of all ADL's without caregiver assistance or the use of an assistive device. 3. I certify that this patient is under my care and that I, or a nurse practitioner or  [id number], or clinical nurse specialist, or certified nurse midwife, working with me, had a Face-to-Face Encounter that meets the physician Face-to-Face Encounter requirements.   The following are the clinical findings from the [address] [place] [address] encounter that support the need for skilled services and is a summary of the encounter:   See Progress Notes     See attached progess note      HAYLEE Moise  1/27/2020      THE FOLLOWING TO BE COMPLETED BY THE COMMUNITY PHYSICIAN:    I concur with the findings described above from the [facility] encounter that this patient is homebound and in need of a skilled service.     Certifying Physician: _____________________________________      Printed Certifying Physician Name: _____________________________________    Date: _________________

## 2020-01-27 NOTE — PROGRESS NOTES
OBSERVATION STATUS  SPEECH LANGUAGE PATHOLOGY: DYSPHAGIA- Initial Assessment    NAME/AGE/GENDER: Nita Doll is a 81 y.o. male  DATE: 1/27/2020  PRIMARY DIAGNOSIS: TIA (transient ischemic attack) [G45.9]      ICD-10: Treatment Diagnosis: R13.12 Dysphagia, Oropharyngeal Phase    INTERDISCIPLINARY COLLABORATION: Registered Nurse  PRECAUTIONS/ALLERGIES: Lamisil [terbinafine]; Loracarbef; Pcn [penicillins]; and Tetracycline       SUBJECTIVE   Patient alert upright in chair for assessment. Wife at bedside. History of Present Injury/Illness: Mr. Edwardo Piper  has a past medical history of Arthritis, CAD (coronary artery disease), CAD (coronary artery disease), Contact dermatitis and other eczema, due to unspecified cause (2/19/2014), Contact dermatitis and other eczema, due to unspecified cause, Essential hypertension, benign (2/19/2014), GERD (gastroesophageal reflux disease), GERD (gastroesophageal reflux disease), HTN (hypertension), Hypertension, Mixed hyperlipidemia (2/19/2014), Other and unspecified hyperlipidemia (2/19/2014), Personal history of colonic polyps (2/19/2014), Type II or unspecified type diabetes mellitus without mention of complication, not stated as uncontrolled (2/19/2014), and Type II or unspecified type diabetes mellitus without mention of complication, not stated as uncontrolled. Drea Vega He also  has a past surgical history that includes hx cataract removal; pr cardiac surg procedure unlist (1992); hx other surgical; hx colonoscopy (11/05/12); and hx hernia repair (Right). Problem List:  (Impairments causing functional limitations):  1. Oropharyngeal dysphagia- No symptoms identified     Previous Dysphagia: NONE REPORTED    Diet Prior to Evaluation: regular/thin       Orientation:   Person  Place    Pain: Pain Scale 1: Numeric (0 - 10)  Pain Intensity 1: 0       Cognitive-Linguistic Screen:   Speech Production:   o Impaired- reduced vocal intensity.  Wife reports soft vocal quality at baseline; however, worsened symptoms this date   Expressive Language:  o Needs further assessment- if indicated by MRI  o Quiet during assessment. Looks to wife to answer questions.  Receptive Language:  o WNL  o Follows 1 and 2 step commands   Cognition:   o Impaired  o Needs further assessment if indicated by MRI  o Reduced short term memory-baseline per wife       OBJECTIVE   Oral Motor:   · Labial: No impairment  · Dentition: Intact and Natural  · Oral Hygiene: Adequate  · Lingual: No impairment     Swallow assessment:   Patient presented with thin by cup/straw/serial sips, mixed,and solid consistency trials. Patient exhibited no overt s/sx airway compromise with solid or liquids. Oral prep time and oral clearance WNL. Denies globus sensation. ASSESSMENT   Patient presents with normal oropharyngeal swallow function. Recommend continue prescribed diet: regular consistency/thin liquids. Medications with liquid wash. No dysphagia goals identified at this time. Will continue to follow for cognitive linguistic assessment if clinically indicated. MRI pending.             Tool Used: Dysphagia Outcome and Severity Scale (FLORENTINO)    Score Comments   Normal Diet  [x] 7 With no strategies or extra time needed   Functional Swallow  [] 6 May have mild oral or pharyngeal delay   Mild Dysphagia  [] 5 Which may require one diet consistency restricted    Mild-Moderate Dysphagia  [] 4 With 1-2 diet consistencies restricted   Moderate Dysphagia  [] 3 With 2 or more diet consistencies restricted   Moderate-Severe Dysphagia  [] 2 With partial PO strategies (trials with ST only)   Severe Dysphagia  [] 1 With inability to tolerate any PO safely      Score:  Initial: 7 Most Recent:  (Date 01/27/20 )   Interpretation of Tool: The Dysphagia Outcome and Severity Scale (FLORENTINO) is a simple, easy-to-use, 7-point scale developed to systematically rate the functional severity of dysphagia based on objective assessment and make recommendations for diet level, independence level, and type of nutrition. Current Medications:   No current facility-administered medications on file prior to encounter. Current Outpatient Medications on File Prior to Encounter   Medication Sig Dispense Refill    memantine (NAMENDA) 10 mg tablet TAKE 1/2 TABLET BY MOUTH TWICE DAILY FOR 1 MONTH THEN 1 TABLET BY MOUTH TWICE DAILY 90 Tab 5    omeprazole (PRILOSEC) 20 mg capsule TAKE 1 CAPSULE BY MOUTH TWICE DAILY 180 Cap 0    metFORMIN (GLUCOPHAGE) 500 mg tablet TAKE 1 TABLET BY MOUTH TWICE DAILY WITH MEALS 180 Tab 1    co-enzyme Q-10 (COQ-10) 100 mg capsule Take 100 mg by mouth daily.  pantoprazole (PROTONIX) 40 mg tablet Take 1 Tab by mouth daily. 90 Tab 3    lisinopril (PRINIVIL, ZESTRIL) 2.5 mg tablet Take 1 Tab by mouth daily. 90 Tab 3    atorvastatin (LIPITOR) 80 mg tablet Take 1 Tab by mouth daily. 90 Tab 3    clopidogrel (PLAVIX) 75 mg tab Take 1 Tab by mouth daily. 90 Tab 3    metoprolol succinate (TOPROL-XL) 25 mg XL tablet Take 0.5 Tabs by mouth daily. 90 Tab 3    DISABLED PLACARD (DISABLED PLACARD) DMV Apply to car 1 Each 0    aspirin 81 mg chewable tablet Take 81 mg by mouth daily.  folic acid (FOLVITE) 1 mg tablet Take  by mouth daily.  multivitamin (ONE A DAY) tablet Take 1 Tab by mouth daily.  Cholecalciferol, Vitamin D3, (VITAMIN D3) 1,000 unit cap Take  by mouth. PLAN    FREQUENCY/DURATION: Speech therapy to follow up for assessment of cognitive-linguistic function.     - Recommendations for next treatment session: Next treatment will address cognitive linguistic assessment if clinically indicated. MRI pending. REHABILITATION POTENTIAL FOR STATED GOALS: Excellent     COMPLIANCE WITH PROGRAM/EXERCISES: Will assess as treatment progresses    CONTINUATION OF SKILLED SERVICES/MEDICAL NECESSITY:   No dysphagia goals identified as swallow function is within normal limits.           RECOMMENDATIONS DIET:    continue prescribed diet   PO:  Regular   Liquids:  regular thin     MEDICATIONS: With liquid     ASPIRATION PRECAUTIONS  · Slow rate of intake  · Small bites/sips  · Upright at 90 degrees during meal     COMPENSATORY STRATEGIES/MODIFICATIONS  · None     EDUCATION:  · Recommendations discussed with Family  · Patient     RECOMMENDATIONS for CONTINUED SPEECH THERAPY:   YES: Anticipate need for ongoing speech therapy during this hospitalization.        SAFETY:  After treatment position/precautions:  · Up in chair  · Wife at bedside  · Call light within reach    Total Treatment Duration:   Time In: 4996  Time Out: [address] [facility] 87, [address] [facility]

## 2020-01-27 NOTE — PROGRESS NOTES
Problem: Self Care Deficits Care Plan (Adult)  Goal: *Acute Goals and Plan of Care (Insert Text)  Description  1. Patient will perform grooming with supervision. 2. Patient will perform upper body dressing with supervision. 3. Patient will perform lower body dressing with supervision. 4. Patient will perform bathing with supervision. 5. Patient will perform toileting and toilet transfer with supervision. 6. Patient will perform ADL functional mobility and tranfers in room with supervision. 7. Patient/family to demonstrate knowledge of home safety and DME recommendations. Goals to be achieved in 7 days. Outcome: Progressing Towards Goal      OCCUPATIONAL THERAPY: Initial Assessment, Daily Note, and AM 1/27/2020  OBSERVATION:    [place] / [place] / Product Type: Medicare /      NAME/AGE/GENDER: Albaro Garcia is a 81 y.o. male   PRIMARY DIAGNOSIS:  TIA (transient ischemic attack) [G45.9] TIA (transient ischemic attack) TIA (transient ischemic attack)        ICD-10: Treatment Diagnosis:    · Generalized Muscle Weakness (M62.81)  · Other lack of cordination (R27.8)   Precautions/Allergies:     Lamisil [terbinafine]; Loracarbef; Pcn [penicillins]; and Tetracycline      ASSESSMENT:     Mr. Michelle Villeda presents with above diagnosis. Pt lives with his wife and receives assist with ADLs per patient report. Pt with increased confusion and weakness noted during evaluation. Pt seated in recliner with LE braces in place upon OT arrival. Pt up to bathroom, see grid below for details. Pt will benefit from acute OT services to address deficits in self care and functional mobility. Pt will likely be able to return home with his wife as a caregiver at discharge. This section established at most recent assessment   PROBLEM LIST (Impairments causing functional limitations):  1. Decreased Strength  2. Decreased ADL/Functional Activities  3. Decreased Transfer Abilities  4.  Decreased Ambulation Ability/Technique  5. Decreased Balance   INTERVENTIONS PLANNED: (Benefits and precautions of occupational therapy have been discussed with the patient.)  1. Activities of daily living training  2. Adaptive equipment training  3. Balance training  4. Clothing management  5. Cognitive training  6. Hygiene training  7. Therapeutic activity  8. Therapeutic exercise     TREATMENT PLAN: Frequency/Duration: Follow patient 3x/week to address above goals. Rehabilitation Potential For Stated Goals: Excellent     REHAB RECOMMENDATIONS (at time of discharge pending progress):    Placement: It is my opinion, based on this patient's performance to date, that Mr. Estrada may benefit from participating in 1-2 additional therapy sessions in order to continue to assess for rehab potential and then make recommendation for disposition at discharge. Equipment:    TBD               OCCUPATIONAL PROFILE AND HISTORY:   History of Present Injury/Illness (Reason for Referral):  See above  Past Medical History/Comorbidities:   Mr. Yohana Dumont  has a past medical history of Arthritis, CAD (coronary artery disease), CAD (coronary artery disease), Contact dermatitis and other eczema, due to unspecified cause (2/19/2014), Contact dermatitis and other eczema, due to unspecified cause, Essential hypertension, benign (2/19/2014), GERD (gastroesophageal reflux disease), GERD (gastroesophageal reflux disease), HTN (hypertension), Hypertension, Mixed hyperlipidemia (2/19/2014), Other and unspecified hyperlipidemia (2/19/2014), Personal history of colonic polyps (2/19/2014), Type II or unspecified type diabetes mellitus without mention of complication, not stated as uncontrolled (2/19/2014), and Type II or unspecified type diabetes mellitus without mention of complication, not stated as uncontrolled.   Mr. Yohana Dumont  has a past surgical history that includes hx cataract removal; pr cardiac surg procedure unlist (1992); hx other surgical; hx colonoscopy (11/05/12); and hx hernia repair (Right). Social History/Living Environment:   Home Environment: Private residence  # Steps to Enter: 3  Rails to Enter: Yes  Hand Rails : Left  One/Two Story Residence: One story  Living Alone: No  Support Systems: Spouse/Significant Other/Partner  Patient Expects to be Discharged to[de-identified] Private residence  Current DME Used/Available at Home: [employer], rolling, Clerance [employer], straight, Shower chair  Tub or Shower Type: Shower  Prior Level of Function/Work/Activity:  Supervision with ADLs and ADL Mobility     Number of Personal Factors/Comorbidities that affect the Plan of Care: Brief history (0):  LOW COMPLEXITY   ASSESSMENT OF OCCUPATIONAL PERFORMANCE[de-identified]   Activities of Daily Living:   Basic ADLs (From Assessment) Complex ADLs (From Assessment)   Feeding: Supervision  Oral Facial Hygiene/Grooming: Supervision  Bathing: Minimum assistance  Upper Body Dressing: Minimum assistance  Lower Body Dressing: Minimum assistance  Toileting: Minimum assistance     Grooming/Bathing/Dressing Activities of Daily Living     Cognitive Retraining  Safety/Judgement: Fall prevention           Toileting  Toileting Assistance: Minimum assistance  Clothing Management: Minimum assistance     Functional Transfers  Bathroom Mobility: Minimum assistance  Toilet Transfer : Minimum assistance  Tub Transfer: Minimum assistance     Bed/Mat Mobility  Supine to Sit: Contact guard assistance  Sit to Supine: (NT)  Sit to Stand: Minimum assistance  Stand to Sit: Minimum assistance  Bed to Chair: Minimum assistance  Scooting: Contact guard assistance     Most Recent Physical Functioning:   Gross Assessment:                  Posture:     Balance:  Sitting: Intact; Without support  Standing: Impaired; With support(walker & bilateral AFO's) Bed Mobility:  Supine to Sit: Contact guard assistance  Sit to Supine: (NT)  Scooting: Contact guard assistance  Wheelchair Mobility:     Transfers:  Sit to Stand: Minimum assistance  Stand to Sit: Minimum assistance  Bed to Chair: Minimum assistance            Patient Vitals for the past 6 hrs:   BP SpO2 Pulse   20 0831  97 % 70   20 0948 125/74         Mental Status  Neurologic State: Alert  Orientation Level: Oriented to person, Oriented to place  Cognition: Impaired decision making  Perception: Appears intact  Perseveration: No perseveration noted  Safety/Judgement: Fall prevention            LLE Assessment  LLE Assessment (WDL): Exception to WDL RLE Assessment  RLE Assessment (WDL): Exceptions to [facility]           Physical Skills Involved:  1. Range of Motion  2. Balance  3. Strength Cognitive Skills Affected (resulting in the inability to perform in a timely and safe manner):  1. None  Psychosocial Skills Affected:  1. None    Number of elements that affect the Plan of Care: 1-3:  LOW COMPLEXITY   CLINICAL DECISION MAKIN[address] [place] [id number] AM-PAC 6 Clicks   Daily Activity Inpatient Short Form  How much help from another person does the patient currently need. .. Total A Lot A Little None   1. Putting on and taking off regular lower body clothing? [] 1   [] 2   [x] 3   [] 4   2. Bathing (including washing, rinsing, drying)? [] 1   [] 2   [x] 3   [] 4   3. Toileting, which includes using toilet, bedpan or urinal?   [] 1   [] 2   [x] 3   [] 4   4. Putting on and taking off regular upper body clothing? [] 1   [] 2   [x] 3   [] 4   5. Taking care of personal grooming such as brushing teeth? [] 1   [] 2   [x] 3   [] 4   6. Eating meals? [] 1   [] 2   [] 3   [x] 4   © , Trustees of [address] [place] [id number], under license to [employer]. All rights reserved      Score:  Initial: 19 Most Recent: X (Date: -- )    Interpretation of Tool:  Represents activities that are increasingly more difficult (i.e. Bed mobility, Transfers, Gait).     Medical Necessity:     · Patient is expected to demonstrate progress in   · strength, balance, coordination, and functional technique  ·  to   · decrease assistance required with ADLs and functional mobility   · . Reason for Services/Other Comments:  · Patient continues to require skilled intervention due to   · Decreased indep with self care and functional mobility   · . Use of outcome tool(s) and clinical judgement create a POC that gives a: LOW COMPLEXITY         TREATMENT:   (In addition to Assessment/Re-Assessment sessions the following treatments were rendered)     Pre-treatment Symptoms/Complaints:    Pain: Initial:   Pain Intensity 1: 0  Post Session:  0     Self Care: (15): Procedure(s) (per grid) utilized to improve and/or restore self-care/home management as related to toileting and grooming. Required minimal verbal and tactile cueing to facilitate activities of daily living skills. Assessment/Reassessment     Braces/Orthotics/Lines/Etc:   · O2 Device: Room air  Treatment/Session Assessment:    · Response to Treatment:  Tolerated well  · Interdisciplinary Collaboration:   o Physical Therapist  o Occupational Therapist  o Registered Nurse  · After treatment position/precautions:   o Up in chair  o Bed alarm/tab alert on  o Bed/Chair-wheels locked  o Call light within reach  o RN notified   · Compliance with Program/Exercises: Will assess as treatment progresses. · Recommendations/Intent for next treatment session: \"Next visit will focus on advancements to more challenging activities\".   Total Treatment Duration:  OT Patient Time In/Time Out  Time In: 1000  Time Out: [address], OT

## 2020-01-27 NOTE — PROGRESS NOTES
Problem: Mobility Impaired (Adult and Pediatric)  Goal: *Acute Goals and Plan of Care (Insert Text)  Description  DISCHARGE GOALS :  (1.)Mr. Estrada will move from supine to sit and sit to supine  with MODIFIED INDEPENDENCE. (2.)Mr. Estrada will transfer from bed to chair and chair to bed with STAND BY ASSIST using bilateral AFO's & walker. (3.)Mr. Estrada will ambulate with STAND BY ASSIST for 100-120 feet with bilateral AFO's & rolling walker. 4) pt able to go up & down 3 steps & rail with CGA. 5) pt safe with HEP using written guidelines. ___________________________   Outcome: Progressing Towards Goal     PHYSICAL THERAPY: Initial Assessment and AM 1/27/2020  OBSERVATION: PT Visit Days : 1  [place] / Product Type: Medicare /       NAME/AGE/GENDER: Velasquez Balderas is a 81 y.o. male   PRIMARY DIAGNOSIS: TIA (transient ischemic attack) [G45.9] TIA (transient ischemic attack) TIA (transient ischemic attack)        ICD-10: Treatment Diagnosis:    · Generalized Muscle Weakness (M62.81)  · Other lack of cordination (R27.8)  · Difficulty in walking, Not elsewhere classified (R26.2)  · Other abnormalities of gait and mobility (R26.89)   Precaution/Allergies:  Lamisil [terbinafine]; Loracarbef; Pcn [penicillins]; and Tetracycline      ASSESSMENT:     Mr. Domingo Sommers presents with general weakness with more level of assist needed than his reported baseline. Pt feels his wife can provide needed assist in home environment but HHPT & OT follow up is also needed. This pt is not safe to be left alone at home or attempt to get up on his own. MSW made aware of safety concerns. This section established at most recent assessment   PROBLEM LIST (Impairments causing functional limitations):  1. Decreased Strength  2. Decreased ADL/Functional Activities  3. Decreased Transfer Abilities  4. Decreased Ambulation Ability/Technique  5. Decreased Balance  6.  Decreased Activity Tolerance  7. Decreased [place] with Home Exercise Program   INTERVENTIONS PLANNED: (Benefits and precautions of physical therapy have been discussed with the patient.)  1. Balance Exercise  2. Bed Mobility  3. Family Education  4. Gait Training  5. Home Exercise Program (HEP)  6. Neuromuscular Re-education/Strengthening  7. Therapeutic Activites  8. Therapeutic Exercise/Strengthening  9. Transfer Training     TREATMENT PLAN: Frequency/Duration: daily for duration of hospital stay  Rehabilitation Potential For Stated Goals: Good     REHAB RECOMMENDATIONS (at time of discharge pending progress):    Placement: It is my opinion, based on this patient's performance to date, that Mr. Estrada may benefit from [address] after discharge due to the functional deficits listed above that are likely to improve with skilled rehabilitation because he/she has multiple medical issues that affect his/her functional mobility in the community. Equipment:    To be detrmined               HISTORY:   History of Present Injury/Illness (Reason for Referral):  Mr. Bernadine Ivy is an 80 yo male with PMH of CVA, CAD s/p CABG, DM2, HTN, HLP, bilateral foot drop and chronic lower back pain ambulating with braces, who is evaluated with weakness. Wife states that he had difficulty ambulating yesterday that is within his normal but became more confused and ataxic today. No syncope. Sleeping more, no pain, no fever. CT head negative.   Past Medical History/Comorbidities:   Mr. Bernadine Ivy  has a past medical history of Arthritis, CAD (coronary artery disease), CAD (coronary artery disease), Contact dermatitis and other eczema, due to unspecified cause (2/19/2014), Contact dermatitis and other eczema, due to unspecified cause, Essential hypertension, benign (2/19/2014), GERD (gastroesophageal reflux disease), GERD (gastroesophageal reflux disease), HTN (hypertension), Hypertension, Mixed hyperlipidemia (2/19/2014), Other and unspecified hyperlipidemia (2/19/2014), Personal history of colonic polyps (2/19/2014), Type II or unspecified type diabetes mellitus without mention of complication, not stated as uncontrolled (2/19/2014), and Type II or unspecified type diabetes mellitus without mention of complication, not stated as uncontrolled. Mr. Kaleigh Mukherjee  has a past surgical history that includes hx cataract removal; pr cardiac surg procedure unlist (1992); hx other surgical; hx colonoscopy (11/05/12); and hx hernia repair (Right). Social History/Living Environment:   Home Environment: Private residence  # Steps to Enter: 3  Rails to Enter: Yes  Hand Rails : Left  One/Two Story Residence: One story  Living Alone: No  Support Systems: Spouse/Significant Other/Partner  Patient Expects to be Discharged to[de-identified] Private residence  Current DME Used/Available at Home: Walker, rolling  Prior Level of Function/Work/Activity:  Pt was functioning with bilateral AFO's & rolling walker in the home & SBA. Personal Factors: Other factors that influence how disability is experienced by the patient:  current & PMH   Number of Personal Factors/Comorbidities that affect the Plan of Care: 3+: HIGH COMPLEXITY   EXAMINATION:   Most Recent Physical Functioning:   Gross Assessment:  AROM: Generally decreased, functional(both UE, LE's & core)  Strength: Generally decreased, functional(both UE, LE's & core)  Coordination: Generally decreased, functional(both UE, LE's & core)                    Balance:  Sitting: Intact; Without support  Standing: Impaired; With support(walker & bilateral AFO's) Bed Mobility:  Supine to Sit: Contact guard assistance  Sit to Supine: (NT)  Scooting: Contact guard assistance       Transfers:  Sit to Stand: Minimum assistance  Stand to Sit: Minimum assistance  Bed to Chair: Minimum assistance(with walker)  Gait:   bilateral AFO's  Speed/Claudia: Delayed  Step Length: Left shortened;Right shortened  Gait Abnormalities: (mild sway)  Distance (ft): 60 Feet (ft)  Assistive Device: Walker, rolling  Ambulation - Level of Assistance: Minimal assistance   Functional Mobility:         Gait/Ambulation:  min        Transfers:  min        Bed Mobility:  cga   Body Structures Involved:  1. Nerves  2. Muscles Body Functions Affected:  1. Neuromusculoskeletal  2. Movement Related Activities and Participation Affected:  1. General Tasks and Demands  2. Mobility   Number of elements that affect the Plan of Care: 4+: HIGH COMPLEXITY   CLINICAL PRESENTATION:   Presentation: Evolving clinical presentation with unstable and unpredictable characteristics: HIGH COMPLEXITY   CLINICAL DECISION MAKIN [facility] Inpatient Short Form  How much difficulty does the patient currently have. .. Unable A Lot A Little None   1. Turning over in bed (including adjusting bedclothes, sheets and blankets)? [] 1   [] 2   [x] 3   [] 4   2. Sitting down on and standing up from a chair with arms ( e.g., wheelchair, bedside commode, etc.)   [] 1   [] 2   [x] 3   [] 4   3. Moving from lying on back to sitting on the side of the bed? [] 1   [] 2   [x] 3   [] 4   How much help from another person does the patient currently need. .. Total A Lot A Little None   4. Moving to and from a bed to a chair (including a wheelchair)? [] 1   [] 2   [x] 3   [] 4   5. Need to walk in hospital room? [] 1   [] 2   [x] 3   [] 4   6. Climbing 3-5 steps with a railing? [x] 1   [] 2   [] 3   [] 4   © , Trustees of [address] [place] [address], under license to [employer]. All rights reserved      Score:  Initial: 16 Most Recent: X (Date: -- )    Interpretation of Tool:  Represents activities that are increasingly more difficult (i.e. Bed mobility, Transfers, Gait).     Medical Necessity:     · Patient is expected to demonstrate progress in   · strength, balance, coordination, and functional technique  ·  to   · decrease assistance required with bed mobility, transfers & gait   · .  Reason for Services/Other Comments:  · Patient continues to require skilled intervention due to   · Pt not safe with functional mobility   · . Use of outcome tool(s) and clinical judgement create a POC that gives a: Difficult prediction of patient's progress: HIGH COMPLEXITY            TREATMENT:   (In addition to Assessment/Re-Assessment sessions the following treatments were rendered)   Pre-treatment Symptoms/Complaints:  Pt feels weaker  Pain: Initial: numeric scale  Pain Intensity 1: 0  Post Session:  0/10     Therapeutic Exercise: (16 Minutes):  Exercises : standing wt-shift R<>L & diagonal wt-shift R<>L using UE support, cool down exercises in chair, LAQ's, hip ABD-ADD, hip flex, to improve mobility, strength, balance, coordination, and dynamic movement of leg - bilateral and core to improve functional stability & muscle control . Assessment/ 15 min    Braces/Orthotics/Lines/Etc:   · IV  Treatment/Session Assessment:    · Response to Treatment:  tolerated well  · Interdisciplinary Collaboration:   o Registered Nurse  o Physician  o   o Certified Nursing Assistant/Patient Care Technician  · After treatment position/precautions:   o Up in chair  o Bed alarm/tab alert on  o Bed/Chair-wheels locked  o Call light within reach  o RN notified   · Compliance with Program/Exercises: Will assess as treatment progresses  · Recommendations/Intent for next treatment session: \"Next visit will focus on reduction in assistance provided\".   Total Treatment Duration:  PT Patient Time In/Time Out  Time In: 0820  Time Out: [address], PT

## 2020-01-27 NOTE — PROGRESS NOTES
MRI checklist and consent obtained from wife over phone. Lalo Giron RN was the second RN to hear wife give consent.

## 2020-01-27 NOTE — PROGRESS NOTES
patient refused to come when department able, patient wanted to finish lunch, will send for when able

## 2020-01-27 NOTE — PROGRESS NOTES
Interdisciplinary team rounds were held 1/27/2020 with the following team members:Care Management, Nursing, Nutrition, Pharmacy, Physical Therapy and Physician. Plan of care discussed. See clinical pathway and/or care plan for interventions and desired outcomes.

## 2020-01-27 NOTE — PROGRESS NOTES
Care Management Interventions  Transition of Care Consult (CM Consult): Discharge Planning, [address]: Yes  Physical Therapy Consult: Yes  Current Support Network: Lives with Spouse, Own Home  Confirm Follow Up Transport: Family  The Patient and/or Patient Representative was Provided with a Choice of Provider and Agrees with the Discharge Plan?: Yes  Freedom of Choice List was Provided with Basic Dialogue that Supports the Patient's Individualized Plan of Care/Goals, Treatment Preferences and Shares the Quality Data Associated with the Providers?: Yes  Discharge Location  Discharge Placement: Home with home health        HUGO spoke with pt & spouse at bedside. PTA pt residing at home & indep with ADL'S. Per MD pt will need HH PT and OT,  SW provided choice list & they prefer [facility]. Pt has a walker but is requesting a W/C for home. HUGO sent referral to [facility]. HUGO sent referral to [facility] for a wheelchair, clinical & orders.

## 2020-01-27 NOTE — PROGRESS NOTES
01/26/20 2681   Dual Skin Pressure Injury Assessment   Dual Skin Pressure Injury Assessment WDL   Second Care Provider (Based on Facility Policy) Shaji Dawson RN   Skin Integumentary   Skin Integumentary (WDL) WDL   Skin Integrity Scars (comment)  (mid chest, LLE, healed)

## 2020-01-27 NOTE — PROGRESS NOTES
Post Fall Documentation    Ganesh Short witnessed/unwitnessed fall occurred on January 26, 2020 at 1930. The answers to the following questions summarize the fall: In the patient's own words:  · What were you attempting to do when you fell?  `trying to get up  · Do you know why you fell? My legs give out  · Do you have any pain/discomfort or any other complaints? Not after falling, no. · Which part of your body made contact with the floor or other object? My hugo. Nurse:  [facility] Was this an assisted fall? no   Was fall witnessed? No   If witnessed, what part of the body made contact with the floor or other object? Not witnessed   Patients mental status after the fall/when found: Alert and oriented to person, place. Same mental status as prior to admit, per family.  Any apparent injury:  No apparent injury   Immediate interventions for injury/suspected injury? No interventions needed   Patient assisted back to bed? Assist X1 by his wife, per pt.  Name of provider notified and time, any comments? Dr. Anderson Gomez, at 9:11 p.m.  Name of family member notified and time: Vickie Elaine, wife present at the time and she reported the incident to the nurse. Document Immediate VS and physical assessment in flowsheets. Document Neuro assessment every hour x 4 (for potential head injury or unwitnessed fall) in flowsheets. Nathalie Jimenez RN      Resting quietly, in bed, alert with family members x 3 at bedside. Resp even, unlab, skin warm, dry. No c/o voiced. Family members report they asst pt to bathroom, leg braces off, closed door while pt toileting, and pt fell when trying to stand up, hitting trash can. Pt denies hitting his head, reports he landed on his buttocks, denies any pain or injury. Assessed with no apparent injury. Instructed pt and family that pt to remain in bed until further notice. Oriented to bed controls, nurse call light. Bed alarm set.

## 2020-01-27 NOTE — PROGRESS NOTES
Attempting to get out of bed, unassisted. Reports he is getting up to go to the bathroom. Asst, with urinal, voiding without difficulty. Incontinent brief clean, dry. Family went home earlier p.m. Call light in reach, bed alarm set, door open. No further needs.

## 2020-01-27 NOTE — PROGRESS NOTES
CODE S called due to patient with decreased ability to read compared to recent assessment per nursing. Otherwise neurologically intact, no focal deficits. Wife at bedside states he typically reads better than his current ability. Missing reading glasses. MRI available now and proceeded with MRI since NIH 0 and improved from prior assessments. Tele neuro consulted . Wife updated.   Melvin Sotelo MD

## 2020-01-28 VITALS
HEIGHT: 66 IN | BODY MASS INDEX: 26.52 KG/M2 | TEMPERATURE: 97.8 F | WEIGHT: 165 LBS | DIASTOLIC BLOOD PRESSURE: 72 MMHG | RESPIRATION RATE: 18 BRPM | OXYGEN SATURATION: 97 % | SYSTOLIC BLOOD PRESSURE: 138 MMHG | HEART RATE: 64 BPM

## 2020-01-28 LAB
ANION GAP SERPL CALC-SCNC: 7 MMOL/L (ref 7–16)
BASOPHILS # BLD: 0.1 K/UL (ref 0–0.2)
BASOPHILS NFR BLD: 1 % (ref 0–2)
BUN SERPL-MCNC: 16 MG/DL (ref 8–23)
CALCIUM SERPL-MCNC: 8.9 MG/DL (ref 8.3–10.4)
CHLORIDE SERPL-SCNC: 104 MMOL/L (ref 98–107)
CO2 SERPL-SCNC: 28 MMOL/L (ref 21–32)
CREAT SERPL-MCNC: 0.92 MG/DL (ref 0.8–1.5)
DIFFERENTIAL METHOD BLD: NORMAL
EOSINOPHIL # BLD: 0.2 K/UL (ref 0–0.8)
EOSINOPHIL NFR BLD: 2 % (ref 0.5–7.8)
ERYTHROCYTE [DISTWIDTH] IN BLOOD BY AUTOMATED COUNT: 13.1 % (ref 11.9–14.6)
GLUCOSE BLD STRIP.AUTO-MCNC: 151 MG/DL (ref 65–100)
GLUCOSE BLD STRIP.AUTO-MCNC: 241 MG/DL (ref 65–100)
GLUCOSE SERPL-MCNC: 175 MG/DL (ref 65–100)
HCT VFR BLD AUTO: 42.4 % (ref 41.1–50.3)
HGB BLD-MCNC: 14.6 G/DL (ref 13.6–17.2)
IMM GRANULOCYTES # BLD AUTO: 0 K/UL (ref 0–0.5)
IMM GRANULOCYTES NFR BLD AUTO: 0 % (ref 0–5)
LYMPHOCYTES # BLD: 3.7 K/UL (ref 0.5–4.6)
LYMPHOCYTES NFR BLD: 44 % (ref 13–44)
MCH RBC QN AUTO: 30 PG (ref 26.1–32.9)
MCHC RBC AUTO-ENTMCNC: 34.4 G/DL (ref 31.4–35)
MCV RBC AUTO: 87.2 FL (ref 79.6–97.8)
MONOCYTES # BLD: 0.5 K/UL (ref 0.1–1.3)
MONOCYTES NFR BLD: 6 % (ref 4–12)
NEUTS SEG # BLD: 4.1 K/UL (ref 1.7–8.2)
NEUTS SEG NFR BLD: 48 % (ref 43–78)
NRBC # BLD: 0 K/UL (ref 0–0.2)
PLATELET # BLD AUTO: 171 K/UL (ref 150–450)
PMV BLD AUTO: 10.4 FL (ref 9.4–12.3)
POTASSIUM SERPL-SCNC: 3.8 MMOL/L (ref 3.5–5.1)
RBC # BLD AUTO: 4.86 M/UL (ref 4.23–5.6)
SODIUM SERPL-SCNC: 139 MMOL/L (ref 136–145)
WBC # BLD AUTO: 8.6 K/UL (ref 4.3–11.1)

## 2020-01-28 PROCEDURE — 36415 COLL VENOUS BLD VENIPUNCTURE: CPT

## 2020-01-28 PROCEDURE — 74011636637 HC RX REV CODE- 636/637: Performed by: INTERNAL MEDICINE

## 2020-01-28 PROCEDURE — 85025 COMPLETE CBC W/AUTO DIFF WBC: CPT

## 2020-01-28 PROCEDURE — 82962 GLUCOSE BLOOD TEST: CPT

## 2020-01-28 PROCEDURE — 74011250637 HC RX REV CODE- 250/637: Performed by: INTERNAL MEDICINE

## 2020-01-28 PROCEDURE — 80048 BASIC METABOLIC PNL TOTAL CA: CPT

## 2020-01-28 PROCEDURE — 97530 THERAPEUTIC ACTIVITIES: CPT

## 2020-01-28 RX ORDER — MEMANTINE HYDROCHLORIDE 5 MG/1
10 TABLET ORAL DAILY
Status: DISCONTINUED | OUTPATIENT
Start: 2020-01-29 | End: 2020-01-28

## 2020-01-28 RX ADMIN — INSULIN LISPRO 4 UNITS: 100 INJECTION, SOLUTION INTRAVENOUS; SUBCUTANEOUS at 12:01

## 2020-01-28 RX ADMIN — CLOPIDOGREL BISULFATE 75 MG: 75 TABLET, FILM COATED ORAL at 08:42

## 2020-01-28 RX ADMIN — ATORVASTATIN CALCIUM 80 MG: 40 TABLET, FILM COATED ORAL at 08:41

## 2020-01-28 RX ADMIN — Medication 10 ML: at 06:37

## 2020-01-28 RX ADMIN — LISINOPRIL 2.5 MG: 5 TABLET ORAL at 08:42

## 2020-01-28 RX ADMIN — PANTOPRAZOLE SODIUM 40 MG: 40 TABLET, DELAYED RELEASE ORAL at 08:41

## 2020-01-28 RX ADMIN — ASPIRIN 81 MG 81 MG: 81 TABLET ORAL at 08:41

## 2020-01-28 RX ADMIN — FOLIC ACID 1 MG: 1 TABLET ORAL at 08:42

## 2020-01-28 RX ADMIN — METOPROLOL SUCCINATE 12.5 MG: 25 TABLET, EXTENDED RELEASE ORAL at 08:42

## 2020-01-28 NOTE — PROGRESS NOTES
Care Management Interventions  Transition of Care Consult (CM Consult): Discharge Planning, [address]: Yes  Physical Therapy Consult: Yes  Current Support Network: Lives with Spouse, Own Home  Confirm Follow Up Transport: Family  The Patient and/or Patient Representative was Provided with a Choice of Provider and Agrees with the Discharge Plan?: Yes  Freedom of Choice List was Provided with Basic Dialogue that Supports the Patient's Individualized Plan of Care/Goals, Treatment Preferences and Shares the Quality Data Associated with the Providers?: Yes  Discharge Location  Discharge Placement: Home with home health      Per MD pt stable for d/c.  HUGO notified Toi Garcia with [facility] of pt's d/c.  UHGO spoke with spouse who confirms received wheelchair.

## 2020-01-28 NOTE — PROGRESS NOTES
Pt assessment completed. Pt in bed. NAD noted. Call light and essentials within reach. Bed alarm on. Pt aware to call with needs. Will monitor.

## 2020-01-28 NOTE — PROGRESS NOTES
Post Fall Documentation      Harlan Late witnessed/unwitnessed fall occurred on 11/27/2020 (Date) at 2045 (Time). The answers to the following questions summarize the fall: In the patient's own words,:  · What were you attempting to do when you fell? Use the bathroom  · Do you know why you fell? no  · Do you have any pain/discomfort or any other complaints? no  · Which part of your body made contact with the floor or other object? Legs and bottom    Nurse:  Nuria Squires Was this an assisted fall? no   Was fall witnessed? No   If witnessed, what part of the body made contact with the floor or other object?  Patients mental status after the fall/when found: Confused   Any apparent injury:  No apparent injury   Immediate interventions for injury/suspected injury? No interventions needed   Patient assisted back to bed? Assist X2   Name of provider notified and time, any comments? Primary RN Molly Leblanc RN notified         Name of family member notified and time: Primary RN Notified family. Immediate VS and physical assessment documented in flow sheets. Neuro assessment every hour x 4 (for potential head injury or unwitnessed fall) documented in flow sheets.       Zenon Andino RN

## 2020-01-28 NOTE — PROGRESS NOTES
Problem: Diabetes Self-Management  Goal: *Disease process and treatment process  Description  Define diabetes and identify own type of diabetes; list 3 options for treating diabetes. Outcome: Progressing Towards Goal  Goal: *Incorporating nutritional management into lifestyle  Description  Describe effect of type, amount and timing of food on blood glucose; list 3 methods for planning meals. Outcome: Progressing Towards Goal  Goal: *Incorporating physical activity into lifestyle  Description  State effect of exercise on blood glucose levels. Outcome: Progressing Towards Goal  Goal: *Developing strategies to promote health/change behavior  Description  Define the ABC's of diabetes; identify appropriate screenings, schedule and personal plan for screenings. Outcome: Progressing Towards Goal  Goal: *Using medications safely  Description  State effect of diabetes medications on diabetes; name diabetes medication taking, action and side effects. Outcome: Progressing Towards Goal  Goal: *Monitoring blood glucose, interpreting and using results  Description  Identify recommended blood glucose targets  and personal targets. Outcome: Progressing Towards Goal  Goal: *Prevention, detection, treatment of acute complications  Description  List symptoms of hyper- and hypoglycemia; describe how to treat low blood sugar and actions for lowering  high blood glucose level. Outcome: Progressing Towards Goal  Goal: *Prevention, detection and treatment of chronic complications  Description  Define the natural course of diabetes and describe the relationship of blood glucose levels to long term complications of diabetes.   Outcome: Progressing Towards Goal  Goal: *Developing strategies to address psychosocial issues  Description  Describe feelings about living with diabetes; identify support needed and support network  Outcome: Progressing Towards Goal  Goal: *Insulin pump training  Outcome: Progressing Towards Goal  Goal: *Sick day guidelines  Outcome: Progressing Towards Goal  Goal: *Patient Specific Goal (EDIT GOAL, INSERT TEXT)  Outcome: Progressing Towards Goal     Problem: Patient Education: Go to Patient Education Activity  Goal: Patient/Family Education  Outcome: Progressing Towards Goal     Problem: Patient Education: Go to Patient Education Activity  Goal: Patient/Family Education  Outcome: Progressing Towards Goal     Problem: TIA/CVA Stroke: 0-24 hours  Goal: Off Pathway (Use only if patient is Off Pathway)  Outcome: Progressing Towards Goal  Goal: Activity/Safety  Outcome: Progressing Towards Goal  Goal: Consults, if ordered  Outcome: Progressing Towards Goal  Goal: Diagnostic Test/Procedures  Outcome: Progressing Towards Goal  Goal: Nutrition/Diet  Outcome: Progressing Towards Goal  Goal: Discharge Planning  Outcome: Progressing Towards Goal  Goal: Medications  Outcome: Progressing Towards Goal  Goal: Respiratory  Outcome: Progressing Towards Goal  Goal: Treatments/Interventions/Procedures  Outcome: Progressing Towards Goal  Goal: Minimize risk of bleeding post-thrombolytic infusion  Outcome: Progressing Towards Goal  Goal: Monitor for complications post-thrombolytic infusion  Outcome: Progressing Towards Goal  Goal: Psychosocial  Outcome: Progressing Towards Goal  Goal: *Hemodynamically stable  Outcome: Progressing Towards Goal  Goal: *Neurologically stable  Description  Absence of additional neurological deficits    Outcome: Progressing Towards Goal  Goal: *Verbalizes anxiety and depression are reduced or absent  Outcome: Progressing Towards Goal  Goal: *Absence of Signs of Aspiration on Current Diet  Outcome: Progressing Towards Goal  Goal: *Absence of deep venous thrombosis signs and symptoms(Stroke Metric)  Outcome: Progressing Towards Goal  Goal: *Ability to perform ADLs and demonstrates progressive mobility and function  Outcome: Progressing Towards Goal  Goal: *Stroke education started(Stroke Metric)  Outcome: Progressing Towards Goal  Goal: *Dysphagia screen performed(Stroke Metric)  Outcome: Progressing Towards Goal  Goal: *Rehab consulted(Stroke Metric)  Outcome: Progressing Towards Goal     Problem: TIA/CVA Stroke: Day 2 Until Discharge  Goal: Off Pathway (Use only if patient is Off Pathway)  Outcome: Progressing Towards Goal  Goal: Activity/Safety  Outcome: Progressing Towards Goal  Goal: Diagnostic Test/Procedures  Outcome: Progressing Towards Goal  Goal: Nutrition/Diet  Outcome: Progressing Towards Goal  Goal: Discharge Planning  Outcome: Progressing Towards Goal  Goal: Medications  Outcome: Progressing Towards Goal  Goal: Respiratory  Outcome: Progressing Towards Goal  Goal: Treatments/Interventions/Procedures  Outcome: Progressing Towards Goal  Goal: Psychosocial  Outcome: Progressing Towards Goal  Goal: *Verbalizes anxiety and depression are reduced or absent  Outcome: Progressing Towards Goal  Goal: *Absence of aspiration  Outcome: Progressing Towards Goal  Goal: *Absence of deep venous thrombosis signs and symptoms(Stroke Metric)  Outcome: Progressing Towards Goal  Goal: *Optimal pain control at patient's stated goal  Outcome: Progressing Towards Goal  Goal: *Tolerating diet  Outcome: Progressing Towards Goal  Goal: *Ability to perform ADLs and demonstrates progressive mobility and function  Outcome: Progressing Towards Goal  Goal: *Stroke education continued(Stroke Metric)  Outcome: Progressing Towards Goal     Problem: Ischemic Stroke: Discharge Outcomes  Goal: *Verbalizes anxiety and depression are reduced or absent  Outcome: Progressing Towards Goal  Goal: *Verbalize understanding of risk factor modification(Stroke Metric)  Outcome: Progressing Towards Goal  Goal: *Hemodynamically stable  Outcome: Progressing Towards Goal  Goal: *Absence of aspiration pneumonia  Outcome: Progressing Towards Goal  Goal: *Aware of needed dietary changes  Outcome: Progressing Towards Goal  Goal: *Verbalize understanding of prescribed medications including anti-coagulants, anti-lipid, and/or anti-platelets(Stroke Metric)  Outcome: Progressing Towards Goal  Goal: *Tolerating diet  Outcome: Progressing Towards Goal  Goal: *Aware of follow-up diagnostics related to anticoagulants  Outcome: Progressing Towards Goal  Goal: *Ability to perform ADLs and demonstrates progressive mobility and function  Outcome: Progressing Towards Goal  Goal: *Absence of DVT(Stroke Metric)  Outcome: Progressing Towards Goal  Goal: *Absence of aspiration  Outcome: Progressing Towards Goal  Goal: *Optimal pain control at patient's stated goal  Outcome: Progressing Towards Goal  Goal: *Home safety concerns addressed  Outcome: Progressing Towards Goal  Goal: *Describes available resources and support systems  Outcome: Progressing Towards Goal  Goal: *Verbalizes understanding of activation of EMS(911) for stroke symptoms(Stroke Metric)  Outcome: Progressing Towards Goal  Goal: *Understands and describes signs and symptoms to report to providers(Stroke Metric)  Outcome: Progressing Towards Goal  Goal: *Neurolgocially stable (absence of additional neurological deficits)  Outcome: Progressing Towards Goal  Goal: *Verbalizes importance of follow-up with primary care physician(Stroke Metric)  Outcome: Progressing Towards Goal  Goal: *Smoking cessation discussed,if applicable(Stroke Metric)  Outcome: Progressing Towards Goal  Goal: *Depression screening completed(Stroke Metric)  Outcome: Progressing Towards Goal

## 2020-01-28 NOTE — PROGRESS NOTES
Patient discharge instructions given to patient and his wife. Opportunity given to ask questions and answered. Dr. Gomez Corrigan to patient's bedside to assist in discharge.

## 2020-01-28 NOTE — PROGRESS NOTES
Problem: Mobility Impaired (Adult and Pediatric)  Goal: *Acute Goals and Plan of Care (Insert Text)  Description  DISCHARGE GOALS :  (1.)Mr. Estrada will move from supine to sit and sit to supine  with MODIFIED INDEPENDENCE. (2.)Mr. Estrada will transfer from bed to chair and chair to bed with STAND BY ASSIST using bilateral AFO's & walker. (3.)Mr. Estrada will ambulate with STAND BY ASSIST for 100-120 feet with bilateral AFO's & rolling walker. 4) pt able to go up & down 3 steps & rail with CGA. 5) pt safe with HEP using written guidelines. ___________________________   Outcome: Progressing Towards Goal     PHYSICAL THERAPY: Daily Note and AM 1/28/2020  INPATIENT: PT Visit Days : 1  [place] / [place] / Product Type: Medicare /       NAME/AGE/GENDER: Scooter Mabry is a 81 y.o. male   PRIMARY DIAGNOSIS: TIA (transient ischemic attack) [G45.9]  CVA (cerebral vascular accident) ([facility] [address] [I63.9] CVA (cerebral vascular accident) ([facility] [address] CVA (cerebral vascular accident) ([facility] [address]       ICD-10: Treatment Diagnosis:    · Generalized Muscle Weakness (M62.81)  · Other lack of cordination (R27.8)  · Difficulty in walking, Not elsewhere classified (R26.2)  · Other abnormalities of gait and mobility (R26.89)   Precaution/Allergies:  Lamisil [terbinafine]; Loracarbef; Pcn [penicillins]; and Tetracycline      ASSESSMENT:     Mr. Dianna Robison presents with general weakness with more level of assist needed than his reported baseline. Pt feels his wife can provide needed assist in home environment but HHPT & OT follow up is also needed. This pt is not safe to be left alone at home or attempt to get up on his own. MSW made aware of safety concerns. 1/28--Pt performed sit to stand. Pt ambulated in room. Pt in bedside chair with needs in reach. This section established at most recent assessment   PROBLEM LIST (Impairments causing functional limitations):  1. Decreased Strength  2.  Decreased ADL/Functional Activities  3. Decreased Transfer Abilities  4. Decreased Ambulation Ability/Technique  5. Decreased Balance  6. Decreased Activity Tolerance  7. Decreased [place] with Home Exercise Program   INTERVENTIONS PLANNED: (Benefits and precautions of physical therapy have been discussed with the patient.)  1. Balance Exercise  2. Bed Mobility  3. Family Education  4. Gait Training  5. Home Exercise Program (HEP)  6. Neuromuscular Re-education/Strengthening  7. Therapeutic Activites  8. Therapeutic Exercise/Strengthening  9. Transfer Training     TREATMENT PLAN: Frequency/Duration: daily for duration of hospital stay  Rehabilitation Potential For Stated Goals: Good     REHAB RECOMMENDATIONS (at time of discharge pending progress):    Placement: It is my opinion, based on this patient's performance to date, that Mr. Estrada may benefit from [address] after discharge due to the functional deficits listed above that are likely to improve with skilled rehabilitation because he/she has multiple medical issues that affect his/her functional mobility in the community. Equipment:    To be detrmined               HISTORY:   History of Present Injury/Illness (Reason for Referral):  Mr. Princess Cantu is an 82 yo male with PMH of CVA, CAD s/p CABG, DM2, HTN, HLP, bilateral foot drop and chronic lower back pain ambulating with braces, who is evaluated with weakness. Wife states that he had difficulty ambulating yesterday that is within his normal but became more confused and ataxic today. No syncope. Sleeping more, no pain, no fever. CT head negative.   Past Medical History/Comorbidities:   Mr. Princess Cantu  has a past medical history of Arthritis, CAD (coronary artery disease), CAD (coronary artery disease), Contact dermatitis and other eczema, due to unspecified cause (2/19/2014), Contact dermatitis and other eczema, due to unspecified cause, Essential hypertension, benign (2/19/2014), GERD (gastroesophageal reflux disease), GERD (gastroesophageal reflux disease), HTN (hypertension), Hypertension, Mixed hyperlipidemia (2/19/2014), Other and unspecified hyperlipidemia (2/19/2014), Personal history of colonic polyps (2/19/2014), Type II or unspecified type diabetes mellitus without mention of complication, not stated as uncontrolled (2/19/2014), and Type II or unspecified type diabetes mellitus without mention of complication, not stated as uncontrolled. Mr. Abbey Eisenmenger  has a past surgical history that includes hx cataract removal; pr cardiac surg procedure unlist (1992); hx other surgical; hx colonoscopy (11/05/12); and hx hernia repair (Right). Social History/Living Environment:   Home Environment: Private residence  # Steps to Enter: 3  Rails to Enter: Yes  Hand Rails : Left  One/Two Story Residence: One story  Living Alone: No  Support Systems: Spouse/Significant Other/Partner  Patient Expects to be Discharged to[de-identified] Private residence  Current DME Used/Available at Home: Walker, rolling, [facility], Ne, straight, Shower chair  Tub or Shower Type: Shower  Prior Level of Function/Work/Activity:  Pt was functioning with bilateral AFO's & rolling walker in the home & SBA. Personal Factors: Other factors that influence how disability is experienced by the patient:  current & PMH   Number of Personal Factors/Comorbidities that affect the Plan of Care: 3+: HIGH COMPLEXITY   EXAMINATION:   Most Recent Physical Functioning:   Gross Assessment:                       Balance:  Sitting: Intact  Standing: Pull to stand; With support  Standing - Static: Fair  Standing - Dynamic : Fair Bed Mobility:          Transfers:  Sit to Stand: Contact guard assistance  Stand to Sit: Contact guard assistance  Gait:   bilateral AFO's  Distance (ft): 60 Feet (ft)  Assistive Device: Walker, rolling  Ambulation - Level of Assistance: Contact guard assistance   Functional Mobility:         Gait/Ambulation:  min        Transfers:  min        Bed Mobility:  cga   Body Structures Involved:  1. Nerves  2. Muscles Body Functions Affected:  1. Neuromusculoskeletal  2. Movement Related Activities and Participation Affected:  1. General Tasks and Demands  2. Mobility   Number of elements that affect the Plan of Care: 4+: HIGH COMPLEXITY   CLINICAL PRESENTATION:   Presentation: Evolving clinical presentation with unstable and unpredictable characteristics: HIGH COMPLEXITY   CLINICAL DECISION MAKIN [facility] Mobility Inpatient Short Form  How much difficulty does the patient currently have. .. Unable A Lot A Little None   1. Turning over in bed (including adjusting bedclothes, sheets and blankets)? [] 1   [] 2   [x] 3   [] 4   2. Sitting down on and standing up from a chair with arms ( e.g., wheelchair, bedside commode, etc.)   [] 1   [] 2   [x] 3   [] 4   3. Moving from lying on back to sitting on the side of the bed? [] 1   [] 2   [x] 3   [] 4   How much help from another person does the patient currently need. .. Total A Lot A Little None   4. Moving to and from a bed to a chair (including a wheelchair)? [] 1   [] 2   [x] 3   [] 4   5. Need to walk in hospital room? [] 1   [] 2   [x] 3   [] 4   6. Climbing 3-5 steps with a railing? [x] 1   [] 2   [] 3   [] 4   © , Trustees of [address] [place] [address], under license to [employer]. All rights reserved      Score:  Initial: 16 Most Recent: X (Date: -- )    Interpretation of Tool:  Represents activities that are increasingly more difficult (i.e. Bed mobility, Transfers, Gait). Medical Necessity:     · Patient is expected to demonstrate progress in   · strength, balance, coordination, and functional technique  ·  to   · decrease assistance required with bed mobility, transfers & gait   · .  Reason for Services/Other Comments:  · Patient continues to require skilled intervention due to   · Pt not safe with functional mobility   · .    Use of outcome tool(s) and clinical judgement create a POC that gives a: Difficult prediction of patient's progress: HIGH COMPLEXITY            TREATMENT:   (In addition to Assessment/Re-Assessment sessions the following treatments were rendered)   Pre-treatment Symptoms/Complaints:  Pt feels weaker  Pain: Initial: numeric scale  Pain Intensity 1: 0  Post Session:  0/10     Therapeutic Exercise: ( ):  Exercises : standing wt-shift R<>L & diagonal wt-shift R<>L using UE support, cool down exercises in chair, LAQ's, hip ABD-ADD, hip flex, to improve mobility, strength, balance, coordination, and dynamic movement of leg - bilateral and core to improve functional stability & muscle control . Therapeutic Activity (    23): Therapeutic activities per grid below to improve mobility. Braces/Orthotics/Lines/Etc:   · IV  Treatment/Session Assessment:    · Response to Treatment:  tolerated well  · Interdisciplinary Collaboration:   o Physical Therapist  o Registered Nurse  · After treatment position/precautions:   o Up in chair  o Bed alarm/tab alert on  o Bed/Chair-wheels locked  o Caregiver at bedside  o Call light within reach  o RN notified   · Compliance with Program/Exercises: Will assess as treatment progresses  · Recommendations/Intent for next treatment session: \"Next visit will focus on reduction in assistance provided\".   Total Treatment Duration:  PT Patient Time In/Time Out  Time In: 1105  Time Out: Ramos 6, PT

## 2020-01-28 NOTE — DISCHARGE SUMMARY
Hospitalist Discharge Summary     Admit Date:  2020  4:18 PM   Name:  Albaro Garcia   Age:  81 y.o.  :  1935   MRN:  [id number]   PCP:  Adebayo Jovel MD  Treatment Team: Attending Provider: Pebbles Cabello MD; Utilization Review: George Power; : Giulia Taveras; Staff Nurse: Ty Hampton, RN; Student Nurse: Biju Gerard; Physical Therapist: Nurys Ribeiro PT    Problem List for this Hospitalization:  Hospital Problems as of 2020 Date Reviewed: 2019          Codes Class Noted - Resolved POA    * (Principal) CVA (cerebral vascular accident) ([facility] [address] ICD-10-CM: I63.9  ICD-9-CM: 434.91  2020 - Present Yes        TIA (transient ischemic attack) ICD-10-CM: G45.9  ICD-9-CM: 435.9  2020 - Present Yes        CAD (coronary artery disease) (Chronic) ICD-10-CM: I25.10  ICD-9-CM: 414.00  2020 - Present Yes        Memory loss (Chronic) ICD-10-CM: R41.3  ICD-9-CM: 780.93  2020 - Present Yes        Foot drop, bilateral ICD-10-CM: M21.371, M21.372  ICD-9-CM: 736.79  2020 - Present Yes        Essential hypertension, benign ICD-10-CM: I10  ICD-9-CM: 401.1  2014 - Present Yes        Mixed hyperlipidemia ICD-10-CM: E78.2  ICD-9-CM: 272.2  2014 - Present Yes                  Hospital Course:  Mr. Michelle Villeda is an 82 yo male with PMH of CVA, CAD s/p CABG, DM2, HTN, HLP, bilateral foot drop and chronic lower back pain ambulating with braces, who is evaluated with weakness. Wife states that he had difficulty ambulating  that is within his normal but became more confused and ataxic on admit. UA negative. CT head negative.  Echocardiogram was done which shows EF of 55 to 60%, no regional wall motion abnormality, there was no left-to-right shunt  MRI was done which showed subacute ischemic changes involving the right thalamic nucleus, more remote ischemic changes involving the medial right occipital lobe and minimally along the right anterior parietal convexity, no acute infarct. Patient does have a history of prior stroke with right-sided weakness. He will continue with his aspirin, Plavix, statin. Patient was evaluated by physical therapy and occupational therapy and was cleared to go home with home PT and OT he is currently medically stable to be discharged home. Patient's LDL cholesterol is 185, he is on high-dose Lipitor at 80 mg nightly which she needs to continue. Discussed with neurology about the MRI findings, his telemetry shows that he is in sinus rhythm, recommend to follow-up with the cardiology for a loop recorder. Follow-up appointment will be made with cardiology in 2 to 3 weeks. Follow up instructions below. Plan was discussed with patient and the wife. All questions answered. Patient was stable at time of discharge and was instructed to call or return if there are any concerns or recurrence of symptoms. As patients symptoms are resolved this event can be considered as TIA    Diagnostic Imaging/Tests:   Mri Brain Wo Cont    Result Date: 1/27/2020  Clinical History: The patient is a 81years year old Male presenting with symptoms of bilateral foot drop and chronic back pain with weakness and difficulty ambulating as well as confusion and ataxia Comparison:  Head CT 1/26/2020 Technique:  Axial T2, axial FLAIR, axial diffusion-weighted,  sagittal T1 and coronal gradient-echo scans were performed. Findings: Focal subacute ischemic changes are demonstrated in the anterior right thalamic nucleus and extending into the superior margin of the right cerebral peduncle and minimally along the posterior right anterior parietal convexity. More remote ischemic changes are demonstrated along the medial right occipital lobe There are otherwise age-related senescent changes with mild chronic periventricular white matter disease. Remote medial right occipital ischemic changes are demonstrated.  Expected flow voids are maintained in the major intracranial vessels. The cerebellum and brainstem are unremarkable. There is no evidence of Chiari malformation. The ventricular system and CSF containing spaces are unremarkable in appearance. There is incidental 2.2 x 2.8 cm arachnoid cyst adjacent to the torcula. Visualized extracranial soft tissues are unremarkable. The paranasal sinuses are well pneumatized and aerated. Impression:  1. Subacute ischemic changes involving the right thalamic nucleus. 2. More remote ischemic changes involving the medial right occipital lobe, and minimally along the right anterior parietal convexity. CPT code(s) [id number]     Ct Head Wo Cont    Result Date: 1/26/2020  CT of the head without contrast. CLINICAL INDICATION: Worsening left-sided weakness, unsteady gait PROCEDURE: Serial thin section axial images are obtained from the cranial vertex through the skull base without the administration of intravenous contrast. Radiation dose reduction techniques were used for this study. Our CT scanners use one or all of the following: Automated exposure control, adjusted of the mA and/or kV according to patient size, iterative reconstruction COMPARISON: Head CT dated 12/13/2019. FINDINGS: There is no acute intracranial hemorrhage, mass, or mass effect. No abnormal extra-axial fluid collections identified. There is no hydrocephalus. The basilar cisterns are widely patent. Stable, mild bilateral frontal lobe atrophy is appreciated. Encephalomalacia is noted in the right occipital lobe in keeping with prior CVA. The remainder the gray-white brain parenchymal interface is maintained. No skull fracture or aggressive osseous lesion noted. The mastoid air cells and included paranasal sinuses are clear. IMPRESSION: 1. No acute intracranial abnormality. 2. Sequela of old right occipital lobe CVA 3. Mild bilateral frontal lobe atrophy.      Duplex Carotid Bilateral    Result Date: 1/27/2020  TITLE:  Carotid and Vertebral Ultrasound Examination. INDICATION:  TIAs. Coronary artery disease. TECHNIQUE: Grayscale, Color Doppler, and Spectral Doppler Ultrasound interrogation performed. Peak Systolic Velocity, ICA-to-CCA ratio, and End-Diastolic Velocity are recorded. The estimate of stenosis is inferred from velocity measurements cross referenced to published correlations as defined by the Society of Radiologists in [address] [place] [address] Drive Radiology 2003; 229; 340-346. COMPARISON: None. RIGHT NECK:  The peak systolic velocity in the Common Carotid Artery = 62 cm/sec; Internal Carotid Artery = 75 cm/sec. Ratio = 1.2. Antegrade flow in the vertebral artery. LEFT  NECK:  The peak systolic velocity in the Common Carotid Artery = 80 cm/sec; Internal Carotid Artery = 80 cm/sec. Ratio = 1.0. Antegrade flow in the vertebral artery. IMPRESSION:  ZULEYMA:  No significant stenosis. LICA:  No significant stenosis. Echocardiogram results:  Results for orders placed or performed during the hospital encounter of 20   2D ECHO COMPLETE ADULT (TTE) W OR WO CONTR    Narrative    [facility] [address] [facility], [facility] [address] [facility]  [phone]    Transthoracic Echocardiogram  2D, M-mode, Doppler, and Color Doppler    Patient: Jose Khan  MR #: [id number]  : 1935  Age: 81 years  Gender: Male  Study date: 2020  Account #: [de-identified]  Height: 66 in  Weight: 164.6 lb  BSA: 1.84 mï¾²  Status:Routine  Location: [facility]  BP: 167/ 99    Allergies: TERBINAFINE, LORACARBEF, PENICILLINS, TETRACYCLINE    Sonographer:  Jaime Cortez [facility]  Group:  [facility]  Referring Physician:  Purvi Drew MD  Reading Physician:  Kevin Márquez MD [facility]    INDICATIONS: TIA    PROCEDURE: This was a routine study. A transthoracic echocardiogram was  performed. The study included complete 2D imaging, M-mode, complete spectral  Doppler, and color Doppler.  Intravenous contrast (Definity, 3 ml) was  administered. Intravenous contrast (agitated saline, 9 ml) was administered. Echocardiographic views were limited by poor acoustic window availability. Image quality was adequate. LEFT VENTRICLE: Size was normal. Systolic function was normal. Ejection  fraction was estimated in the range of 55 % to 60 %. There were no regional  wall motion abnormalities. There was mild concentric hypertrophy. Left  ventricular diastolic function was within normal limits. Average E/e' of   10.7. RIGHT VENTRICLE: The size was normal. Systolic function was normal.    LEFT ATRIUM: Size was normal.    ATRIAL SEPTUM: Agitated saline contrast injection (bubble study) was   performed. There was no left-to-right shunt and no right-to-left shunt. RIGHT ATRIUM: Size was normal.    SYSTEMIC VEINS: IVC: The inferior vena cava was normal in size and course. AORTIC VALVE: The valve was trileaflet. The noncoronary cusp demonstrated  mildly increased thickness. The right coronary cusp demonstrated mildly  increased thickness. There was no evidence for stenosis. There was mild  regurgitation. MITRAL VALVE: Valve structure was normal. There was no evidence for stenosis. There was no significant regurgitation. TRICUSPID VALVE: The valve structure was normal. There was no evidence for  stenosis. There was mild regurgitation. PULMONIC VALVE: The valve structure was normal. There was no evidence for  stenosis. There was no insufficiency. PERICARDIUM: There was no pericardial effusion. AORTA: The root exhibited normal size. SUMMARY:    -  Left ventricle: Systolic function was normal. Ejection fraction was  estimated in the range of 55 % to 60 %. There were no regional wall motion  abnormalities. There was mild concentric hypertrophy. -  Atrial septum: Agitated saline contrast injection (bubble study) was  performed. There was no left-to-right shunt and no right-to-left shunt.    -  Tricuspid valve:  There was mild regurgitation. SYSTEM MEASUREMENT TABLES    2D mode  AoR Diam (2D): 3.3 cm  LA Dimension (2D): 3.6 cm  Left Atrium Systolic Volume Index; Method of Disks, Biplane; 2D mode;: 27.7  ml/m2  IVS/LVPW (2D): 1.1  IVSd (2D): 1.5 cm  LVIDd (2D): 3.8 cm  LVIDs (2D): 2.6 cm  LVPWd (2D): 1.3 cm  RVIDd (2D): 3 cm    Unspecified Scan Mode  Peak Grad; Mean; Antegrade Flow: 9 mm[Hg]  Vmax;  Antegrade Flow: 156 cm/s    Prepared and signed by    Benson Hunter MD [facility]  Signed 27-Jan-2020 16:50:15           All Micro Results     None          Labs: Results:       BMP, Mg, Phos Recent Labs     01/28/20  0558 01/26/20  1638    137   K 3.8 4.1    105   CO2 28 30   AGAP 7 2*   BUN 16 13   CREA 0.92 0.93   CA 8.9 9.7   * 176*      CBC Recent Labs     01/28/20  0558 01/26/20  1638   WBC 8.6 8.9   RBC 4.86 4.99   HGB 14.6 15.4   HCT 42.4 43.6    193   GRANS 48 58   LYMPH 44 36   EOS 2 1   MONOS 6 5   BASOS 1 0   IG 0 0   ANEU 4.1 5.2   ABL 3.7 3.2   GREGORY 0.2 0.1   ABM 0.5 0.5   ABB 0.1 0.0   AIG 0.0 0.0      LFT Recent Labs     01/26/20  1638   SGOT 21   ALT 27   AP 77   TP 7.5   ALB 3.4   GLOB 4.1*   AGRAT 0.8*      Cardiac Testing No results found for: BNPP, BNP, CPK, RCK1, RCK2, RCK3, RCK4, CKMB, CKNDX, CKND1, TROPT, TROIQ   Coagulation Tests No results found for: PTP, INR, APTT, INREXT, INREXT   A1c Lab Results   Component Value Date/Time    Hemoglobin A1c 6.7 01/27/2020 06:25 AM    Hemoglobin A1c 6.9 01/26/2020 04:38 PM    Hemoglobin A1c 6.7 (H) 07/16/2019 11:59 AM    Hemoglobin A1c, External 6.2 07/23/2014      Lipid Panel Lab Results   Component Value Date/Time    Cholesterol, total 258 01/27/2020 06:25 AM    HDL Cholesterol 43 01/27/2020 06:25 AM    LDL, calculated 185 (H) 01/27/2020 06:25 AM    VLDL, calculated 30 (H) 01/27/2020 06:25 AM    Triglyceride 150 01/27/2020 06:25 AM    CHOL/HDL Ratio 6.0 01/27/2020 06:25 AM      Thyroid Panel Lab Results   Component Value Date/Time    TSH 1.150 07/16/2019 11:59 AM        Most Recent UA Lab Results   Component Value Date/Time    Color YELLOW 01/26/2020 11:32 PM    Appearance CLOUDY 01/26/2020 11:32 PM    Specific gravity 1.017 01/26/2020 11:32 PM    pH (UA) 6.5 01/26/2020 11:32 PM    Protein 30 (A) 01/26/2020 11:32 PM    Glucose 250 01/26/2020 11:32 PM    Ketone NEGATIVE  01/26/2020 11:32 PM    Bilirubin NEGATIVE  01/26/2020 11:32 PM    Blood NEGATIVE  01/26/2020 11:32 PM    Urobilinogen 0.2 01/26/2020 11:32 PM    Nitrites NEGATIVE  01/26/2020 11:32 PM    Leukocyte Esterase NEGATIVE  01/26/2020 11:32 PM        Allergies   Allergen Reactions    Lamisil [Terbinafine] Rash    Loracarbef Rash    Pcn [Penicillins] Rash    Tetracycline Rash     Immunization History   Administered Date(s) Administered    Hep A Vaccine 08/22/2008    Hep B Vaccine 03/07/2008, 07/28/2008    Influenza Vaccine 10/27/2014    Pneumococcal Conjugate (PCV-13) 04/16/2018    Pneumococcal Vaccine (Unspecified Type) 07/23/2014    TB Skin Test (PPD) Intradermal 01/27/2020       All Labs from Last 24 Hrs:  Recent Results (from the past 24 hour(s))   GLUCOSE, POC    Collection Time: 01/27/20  2:53 PM   Result Value Ref Range    Glucose (POC) 169 (H) 65 - 100 mg/dL   GLUCOSE, POC    Collection Time: 01/27/20  4:19 PM   Result Value Ref Range    Glucose (POC) 154 (H) 65 - 100 mg/dL   CBC WITH AUTOMATED DIFF    Collection Time: 01/28/20  5:58 AM   Result Value Ref Range    WBC 8.6 4.3 - 11.1 K/uL    RBC 4.86 4.23 - 5.6 M/uL    HGB 14.6 13.6 - 17.2 g/dL    HCT 42.4 41.1 - 50.3 %    MCV 87.2 79.6 - 97.8 FL    MCH 30.0 26.1 - 32.9 PG    MCHC 34.4 31.4 - 35.0 g/dL    RDW 13.1 11.9 - 14.6 %    PLATELET 742 933 - 440 K/uL    MPV 10.4 9.4 - 12.3 FL    ABSOLUTE NRBC 0.00 0.0 - 0.2 K/uL    DF AUTOMATED      NEUTROPHILS 48 43 - 78 %    LYMPHOCYTES 44 13 - 44 %    MONOCYTES 6 4.0 - 12.0 %    EOSINOPHILS 2 0.5 - 7.8 %    BASOPHILS 1 0.0 - 2.0 %    IMMATURE GRANULOCYTES 0 0.0 - 5.0 %    ABS. NEUTROPHILS 4.1 1.7 - 8.2 K/UL    ABS. LYMPHOCYTES 3.7 0.5 - 4.6 K/UL    ABS. MONOCYTES 0.5 0.1 - 1.3 K/UL    ABS. EOSINOPHILS 0.2 0.0 - 0.8 K/UL    ABS. BASOPHILS 0.1 0.0 - 0.2 K/UL    ABS. IMM. GRANS. 0.0 0.0 - 0.5 K/UL   METABOLIC PANEL, BASIC    Collection Time: 01/28/20  5:58 AM   Result Value Ref Range    Sodium 139 136 - 145 mmol/L    Potassium 3.8 3.5 - 5.1 mmol/L    Chloride 104 98 - 107 mmol/L    CO2 28 21 - 32 mmol/L    Anion gap 7 7 - 16 mmol/L    Glucose 175 (H) 65 - 100 mg/dL    BUN 16 8 - 23 MG/DL    Creatinine 0.92 0.8 - 1.5 MG/DL    GFR est AA >60 >60 ml/min/1.73m2    GFR est non-AA >60 >60 ml/min/1.73m2    Calcium 8.9 8.3 - 10.4 MG/DL   GLUCOSE, POC    Collection Time: 01/28/20  7:40 AM   Result Value Ref Range    Glucose (POC) 151 (H) 65 - 100 mg/dL   GLUCOSE, POC    Collection Time: 01/28/20 11:09 AM   Result Value Ref Range    Glucose (POC) 241 (H) 65 - 100 mg/dL       Discharge Exam:  Patient Vitals for the past 24 hrs:   Temp Pulse Resp BP SpO2   01/28/20 1114 97.3 °F (36.3 °C) 71 17 132/76 98 %   01/28/20 0803 96.3 °F (35.7 °C) 62 14 (!) 162/93 98 %   01/28/20 0323 97.9 °F (36.6 °C) 70 18 153/82 97 %   01/27/20 2314 98.7 °F (37.1 °C) 61 18 145/81 98 %   01/27/20 2045 98.1 °F (36.7 °C) 68 20 182/78 98 %   01/27/20 1925 98.5 °F (36.9 °C) (!) 57 18 136/75 98 %   01/27/20 1730 95.7 °F (35.4 °C) (!) 58 18 151/65 98 %     Oxygen Therapy  O2 Sat (%): 98 % (01/28/20 1114)  O2 Device: Room air (01/28/20 0800)    Intake/Output Summary (Last 24 hours) at 1/28/2020 1320  Last data filed at 1/28/2020 0607  Gross per 24 hour   Intake    Output 350 ml   Net -350 ml           General:- Conscious, No acute distress   Lungs- CTA Bilaterally, No significant wheezing  Heart:- S1 S2 regular  Abdomen:- Soft, Positive bowel sounds, NTND, No guarding/rigidity/rebound tend.   Extremities:-No pedal edema  Neurologic: - AOX3, Rt side weakness  Psych: - Appropriate mood      Discharge Info:   Current Discharge Medication List      CONTINUE these medications which have NOT CHANGED    Details   memantine (NAMENDA) 10 mg tablet TAKE 1/2 TABLET BY MOUTH TWICE DAILY FOR 1 MONTH THEN 1 TABLET BY MOUTH TWICE DAILY  Qty: 90 Tab, Refills: 5    Comments: **Patient requests 90 days supply**      omeprazole (PRILOSEC) 20 mg capsule TAKE 1 CAPSULE BY MOUTH TWICE DAILY  Qty: 180 Cap, Refills: 0      metFORMIN (GLUCOPHAGE) 500 mg tablet TAKE 1 TABLET BY MOUTH TWICE DAILY WITH MEALS  Qty: 180 Tab, Refills: 1      co-enzyme Q-10 (COQ-10) 100 mg capsule Take 100 mg by mouth daily. pantoprazole (PROTONIX) 40 mg tablet Take 1 Tab by mouth daily. Qty: 90 Tab, Refills: 3      lisinopril (PRINIVIL, ZESTRIL) 2.5 mg tablet Take 1 Tab by mouth daily. Qty: 90 Tab, Refills: 3      atorvastatin (LIPITOR) 80 mg tablet Take 1 Tab by mouth daily. Qty: 90 Tab, Refills: 3    Associated Diagnoses: Mixed hyperlipidemia      clopidogrel (PLAVIX) 75 mg tab Take 1 Tab by mouth daily. Qty: 90 Tab, Refills: 3      metoprolol succinate (TOPROL-XL) 25 mg XL tablet Take 0.5 Tabs by mouth daily. Qty: 90 Tab, Refills: 3    Associated Diagnoses: Essential hypertension, benign      DISABLED PLACARD (DISABLED PLACARD) DMV Apply to car  Qty: 1 Each, Refills: 0    Associated Diagnoses: Encounter for immunization      aspirin 81 mg chewable tablet Take 81 mg by mouth daily. folic acid (FOLVITE) 1 mg tablet Take  by mouth daily. multivitamin (ONE A DAY) tablet Take 1 Tab by mouth daily. Cholecalciferol, Vitamin D3, (VITAMIN D3) 1,000 unit cap Take  by mouth.                  Disposition: home    Activity: Activity as tolerated  Diet: DIET DIABETIC CONSISTENT CARB Regular    Follow-up Information     Follow up With Specialties Details Why Contact Info    Kristine Ely, [address]  [phone]              Time spent in patient discharge planning and coordination 79 minutes.     Signed:  David Contreras MD

## 2020-01-28 NOTE — DISCHARGE INSTRUCTIONS
Make a follow up with your Cardiologist for the next week. Tell them you've been in the hospital for stroke. Stroke: After Your Visit     Your Care Instructions  Risk factors for stroke include being overweight, smoking, and sedentary lifestyle. This means that the blood flow to a part of your brain was blocked for some time, which damages the nerve cells in that part of the brain. The part of your body controlled by that part of your brain may not function properly now. The brain is an amazing organ that can heal itself to some degree. The stroke you had damaged part of your brain, but other parts of your brain may take over in some way for the damaged areas. You have already started this process. Going home may be hard for you and your family. The more you can try to do for yourself, the better. Remember to take each day one at a time. Follow-up care is a key part of your treatment and safety. Be sure to make and go to all appointments, and call your doctor if you are having problems. Its also a good idea to know your test results and keep a list of the medicines you take. How can you care for yourself at home? Enter a stroke rehabilitation (rehab) program, if your doctor recommends it. Physical, speech, and occupational therapies can help you manage bathing, dressing, eating, and other basics of daily living. Eat a heart-healthy diet that is low in cholesterol, saturated fat, and salt. Eat lots of fresh fruits and vegetables and foods high in fiber. Increase your activities slowly. Take short rest breaks when you get tired. Gradually increase the amount you walk. Start out by walking a little more than you did the day before. Do not drive until your doctor says it is okay. It is normal to feel sad or depressed after a stroke. If the blues last, talk to your doctor.   If you are having problems with urine leakage, go to the bathroom at regular times, including when you first wake up and at bedtime. Also, limit fluids after dinner. If you are constipated, drink plenty of fluids, enough so that your urine is light yellow or clear like water. If you have kidney, heart, or liver disease and have to limit fluids, talk with your doctor before you increase the amount of fluids you drink. Set up a regular time for using the toilet. If you continue to have constipation, your doctor may suggest using a bulking agent, such as Metamucil, or a stool softener, laxative, or enema. Medicines  Take your medicines exactly as prescribed. Call your doctor if you think you are having a problem with your medicine. You may be taking several medicines. ACE (angiotensin-converting enzyme) inhibitors, angiotensin II receptor blockers (ARBs), beta-blockers, diuretics (water pills), and calcium channel blockers control your blood pressure. Statins help lower cholesterol. Your doctor may also prescribe medicines for depression, pain, sleep problems, anxiety, or agitation. If your doctor has given you medicine that prevents blood clots, such as warfarin (Coumadin), aspirin combined with extended-release dipyridamole (Aggrenox), clopidogrel (Plavix), or aspirin to prevent another stroke, you should:  Tell your dentist, pharmacist, and other health professionals that you take these medicines. Watch for unusual bruising or bleeding, such as blood in your urine, red or black stools, or bleeding from your nose or gums. Get regular blood tests to check your clotting time if you are taking Coumadin. Wear medical alert jewelry that says you take blood thinners. You can buy this at most drugstores. Do not take any over-the-counter medicines or herbal products without talking to your doctor first.  If you take birth control pills or hormone replacement therapy, talk to your doctor about whether they are right for you. For family members and caregivers  Make the home safe.  Set up a room so that your loved one does not have to climb stairs. Be sure the bathroom is on the same floor. Move throw rugs and furniture that could cause falls, and make sure that the lighting is good. Put grab bars and seats in tubs and showers. Find out what your loved one can do and what he or she needs help with. Try not to do things for your loved one that your loved one can do on his or her own. Help him or her learn and practice new skills. Visit and talk with your loved one often. Try doing activities together that you both enjoy, such as playing cards or board games. Keep in touch with your loved one's friends as much as you can, and encourage them to visit. Take care of yourself. Do not try to do everything yourself. Ask other family members to help. Eat well, get enough rest, and take time to do things that you enjoy. Keep up with your own doctor visits, and make sure to take your medicines regularly. Get out of the house as much as you can. Join a local support group. Find out if you qualify for home health care visits to help with rehab or for adult day care. When should you call for help? Call 911 anytime you think you may need emergency care. For example, call if:  You have signs of another stroke. These may include:  Sudden numbness, paralysis, or weakness in your face, arm, or leg, especially on only one side of your body. New problems with walking or balance. Sudden vision changes. Drooling or slurred speech. New problems speaking or understanding simple statements, or you feel confused. A sudden, severe headache that is different from past headaches. Call 911 even if these symptoms go away in a few minutes. You cough up blood. You vomit blood or what looks like coffee grounds. You pass maroon or very bloody stools. Call your doctor now or seek immediate medical care if:  You have new bruises or blood spots under your skin. You have a nosebleed. Your gums bleed when you brush your teeth. You have blood in your urine.   Your stools are black and tarlike or have streaks of blood. You have vaginal bleeding when you are not having your period, or heavy period bleeding. You have new symptoms that may be related to your stroke, such as falls or trouble swallowing. Watch closely for changes in your health, and be sure to contact your doctor if you have any problems. Where can you learn more? Go to [employer]    Enter C294  in the search box to learn more about \"Stroke: After Your Visit\". © 5801-0221 [employer]. Care instructions adapted under license by [employer] (which disclaims liability or warranty for this information). This care instruction is for use with your licensed healthcare professional. If you have questions about a medical condition or this instruction, always ask your healthcare professional. Shari Lindo any warranty or liability for your use of this information.

## 2020-01-28 NOTE — PROGRESS NOTES
Patient assessment completed. Patient is in bed. Alert and oriented x 4. Heart is irregular in rate and rhythm. Lung sounds are clear. Bowel sounds active. Patient is voiding in urinal without difficulty. Bed alarm is on, bed is low and locked into position. Call light within reach.

## 2020-01-29 ENCOUNTER — PATIENT OUTREACH (OUTPATIENT)
Dept: CASE MANAGEMENT | Age: 85
End: 2020-01-29

## 2020-01-29 ENCOUNTER — HOME CARE VISIT (OUTPATIENT)
Dept: SCHEDULING | Facility: HOME HEALTH | Age: 85
End: 2020-01-29
Payer: MEDICARE

## 2020-01-29 VITALS
RESPIRATION RATE: 16 BRPM | TEMPERATURE: 97.9 F | DIASTOLIC BLOOD PRESSURE: 68 MMHG | HEART RATE: 59 BPM | SYSTOLIC BLOOD PRESSURE: 137 MMHG | OXYGEN SATURATION: 99 %

## 2020-01-29 PROCEDURE — 3331090001 HH PPS REVENUE CREDIT

## 2020-01-29 PROCEDURE — 400013 HH SOC

## 2020-01-29 PROCEDURE — G0151 HHCP-SERV OF PT,EA 15 MIN: HCPCS

## 2020-01-29 PROCEDURE — 3331090002 HH PPS REVENUE DEBIT

## 2020-01-29 NOTE — PROGRESS NOTES
This note will not be viewable in [address] Transition of Care Discharge Follow-up Questionnaire   Date/Time of Call:   1/29/2020   1025am   What was the patient hospitalized for? CVA     Does the patient understand his/her diagnosis and/or treatment and what happened during the hospitalization? Yes, spoke with patients wife, she states understanding of diagnosis and treatment; and is agreeable to call. Mrs. Estrada states patient is doing well    Did the patient receive discharge instructions? Yes    CM Assessed Risk for Readmission:       Patient stated Risk for Readmission:      Low/moderate  r/t diagnosis and/or comorbidities     None stated   Review any discharge instructions (see discharge instructions/AVS in [facility]). Ask patient if they understand these. Do they have any questions? Reviewed, understanding is stated, no questions at this time       Were home services ordered (nursing, PT, OT, ST, etc.)? Yes, [facility] PT/OT   If so, has the first visit occurred? If not, why? (Assist with coordination of services if necessary.)   [facility] 1/29/20   Was any DME ordered? Yes, steff (eugenio)  Mrs. Estrada states they have rollator. She will discuss with HH if they can help with standard walker, will return call if they are unable to assist      If so, has it been received? If not, why?  (Assist patient in obtaining DME orders &/or equipment if necessary.) Yes      Complete a review of all medications (new, continued and discontinued meds per the D/C instructions and medication tab in [facility]). Completed      Were all new prescriptions filled? If not, why?  (Assist patient in obtaining medications if necessary  escalate for CCM &/or SW if ongoing issues are verbalized by pt or anticipated)   No new medications or changes at discharge. Does the patient understand the purpose and dosing instructions for all medications?   (If patient has questions, provide explanation and education.)   Yes Does the patient have any problems in performing ADLs? (If patient is unable to perform ADLs  what is the limiting factor(s)? Do they have a support system that can assist? If no support system is present, discuss possible assistance that they may be able to obtain. Escalate for CCM/SW if ongoing issues are verbalized by pt or anticipated)   Independent with ADLs at baseline. Mrs. Estrada is strong support and assists patient at present         Does the patient have all follow-up appointments scheduled? 7 day f/up with PCP?   (f/up with PCP may be w/in 14 days if patient has a f/up with their specialist w/in 7 days)    7-14 day f/up with specialist?   (or per discharge instructions)    If f/up has not been made  what actions has the care coordinator made to accomplish this? Has transportation been arranged? Yes      Dr. Valencia Hieu 2/5/20                      Yes, no transportation needs are identified at this time   Any other questions or concerns expressed by the patient? No other needs or concerns identified. Mrs. Estrada states her gratitude for follow up. Contact information for Care Coordinator was given, instructed to call with new questions or concerns. Schedule next appointment with RAYA Maldonado or refer to RN Case Manager/ per the workflow guidelines. When is care coordinators next follow-up call scheduled? If referred for CCM  what RN care manager was the referral assigned? Care Coordinator will follow per workflow guidelines.           Within 14 days   [facility] Call Completed By: Sheba Mayberry LPN  Care Coordinator

## 2020-01-30 ENCOUNTER — HOME CARE VISIT (OUTPATIENT)
Dept: HOME HEALTH SERVICES | Facility: HOME HEALTH | Age: 85
End: 2020-01-30
Payer: MEDICARE

## 2020-01-30 PROCEDURE — 3331090002 HH PPS REVENUE DEBIT

## 2020-01-30 PROCEDURE — 3331090001 HH PPS REVENUE CREDIT

## 2020-01-31 ENCOUNTER — HOME CARE VISIT (OUTPATIENT)
Dept: SCHEDULING | Facility: HOME HEALTH | Age: 85
End: 2020-01-31
Payer: MEDICARE

## 2020-01-31 PROCEDURE — G0157 HHC PT ASSISTANT EA 15: HCPCS

## 2020-01-31 PROCEDURE — 3331090001 HH PPS REVENUE CREDIT

## 2020-01-31 PROCEDURE — 3331090002 HH PPS REVENUE DEBIT

## 2020-02-01 VITALS
DIASTOLIC BLOOD PRESSURE: 72 MMHG | RESPIRATION RATE: 18 BRPM | HEART RATE: 82 BPM | TEMPERATURE: 97.3 F | OXYGEN SATURATION: 98 % | SYSTOLIC BLOOD PRESSURE: 142 MMHG

## 2020-02-01 PROCEDURE — 3331090001 HH PPS REVENUE CREDIT

## 2020-02-01 PROCEDURE — 3331090002 HH PPS REVENUE DEBIT

## 2020-02-02 PROCEDURE — 3331090002 HH PPS REVENUE DEBIT

## 2020-02-02 PROCEDURE — 3331090001 HH PPS REVENUE CREDIT

## 2020-02-03 PROCEDURE — 3331090002 HH PPS REVENUE DEBIT

## 2020-02-03 PROCEDURE — 3331090001 HH PPS REVENUE CREDIT

## 2020-02-03 NOTE — CDMP QUERY
Patient has a history of CVA with right-sided weakness. The patient presented 
with weakness, confusion, and ataxia. CT and MRI of the head were negative for 
acute process. The patient was continued on aspirin, statin, and Plavix. After 
study, can the patient's condition be further specified as: Corky Demarco ·TIA ·Other____________________ ·No further documentation can be provided Thanks, CDI Team 
[phone]

## 2020-02-04 ENCOUNTER — HOME CARE VISIT (OUTPATIENT)
Dept: SCHEDULING | Facility: HOME HEALTH | Age: 85
End: 2020-02-04
Payer: MEDICARE

## 2020-02-04 PROCEDURE — G0157 HHC PT ASSISTANT EA 15: HCPCS

## 2020-02-04 PROCEDURE — 3331090002 HH PPS REVENUE DEBIT

## 2020-02-04 PROCEDURE — 3331090001 HH PPS REVENUE CREDIT

## 2020-02-05 VITALS
HEART RATE: 76 BPM | SYSTOLIC BLOOD PRESSURE: 116 MMHG | TEMPERATURE: 97.2 F | RESPIRATION RATE: 18 BRPM | OXYGEN SATURATION: 98 % | DIASTOLIC BLOOD PRESSURE: 62 MMHG

## 2020-02-05 PROCEDURE — 3331090002 HH PPS REVENUE DEBIT

## 2020-02-05 PROCEDURE — 3331090001 HH PPS REVENUE CREDIT

## 2020-02-06 ENCOUNTER — HOME CARE VISIT (OUTPATIENT)
Dept: SCHEDULING | Facility: HOME HEALTH | Age: 85
End: 2020-02-06
Payer: MEDICARE

## 2020-02-06 VITALS
TEMPERATURE: 97.2 F | OXYGEN SATURATION: 98 % | SYSTOLIC BLOOD PRESSURE: 128 MMHG | HEART RATE: 76 BPM | DIASTOLIC BLOOD PRESSURE: 74 MMHG | RESPIRATION RATE: 18 BRPM

## 2020-02-06 VITALS — SYSTOLIC BLOOD PRESSURE: 124 MMHG | TEMPERATURE: 97.3 F | DIASTOLIC BLOOD PRESSURE: 68 MMHG | HEART RATE: 72 BPM

## 2020-02-06 PROCEDURE — 3331090001 HH PPS REVENUE CREDIT

## 2020-02-06 PROCEDURE — G0157 HHC PT ASSISTANT EA 15: HCPCS

## 2020-02-06 PROCEDURE — G0152 HHCP-SERV OF OT,EA 15 MIN: HCPCS

## 2020-02-06 PROCEDURE — 3331090002 HH PPS REVENUE DEBIT

## 2020-02-07 ENCOUNTER — HOME CARE VISIT (OUTPATIENT)
Dept: SCHEDULING | Facility: HOME HEALTH | Age: 85
End: 2020-02-07
Payer: MEDICARE

## 2020-02-07 PROCEDURE — 3331090001 HH PPS REVENUE CREDIT

## 2020-02-07 PROCEDURE — G0156 HHCP-SVS OF AIDE,EA 15 MIN: HCPCS

## 2020-02-07 PROCEDURE — 3331090002 HH PPS REVENUE DEBIT

## 2020-02-08 PROCEDURE — 3331090001 HH PPS REVENUE CREDIT

## 2020-02-08 PROCEDURE — 3331090002 HH PPS REVENUE DEBIT

## 2020-02-09 VITALS
TEMPERATURE: 98.8 F | RESPIRATION RATE: 18 BRPM | HEART RATE: 80 BPM | SYSTOLIC BLOOD PRESSURE: 140 MMHG | DIASTOLIC BLOOD PRESSURE: 75 MMHG

## 2020-02-09 PROCEDURE — 3331090002 HH PPS REVENUE DEBIT

## 2020-02-09 PROCEDURE — 3331090001 HH PPS REVENUE CREDIT

## 2020-02-10 ENCOUNTER — HOME CARE VISIT (OUTPATIENT)
Dept: SCHEDULING | Facility: HOME HEALTH | Age: 85
End: 2020-02-10
Payer: MEDICARE

## 2020-02-10 VITALS
TEMPERATURE: 98 F | RESPIRATION RATE: 18 BRPM | HEART RATE: 75 BPM | DIASTOLIC BLOOD PRESSURE: 62 MMHG | SYSTOLIC BLOOD PRESSURE: 130 MMHG

## 2020-02-10 PROCEDURE — G0158 HHC OT ASSISTANT EA 15: HCPCS

## 2020-02-10 PROCEDURE — 3331090001 HH PPS REVENUE CREDIT

## 2020-02-10 PROCEDURE — 3331090002 HH PPS REVENUE DEBIT

## 2020-02-11 ENCOUNTER — HOME CARE VISIT (OUTPATIENT)
Dept: SCHEDULING | Facility: HOME HEALTH | Age: 85
End: 2020-02-11
Payer: MEDICARE

## 2020-02-11 PROCEDURE — G0156 HHCP-SVS OF AIDE,EA 15 MIN: HCPCS

## 2020-02-11 PROCEDURE — G0157 HHC PT ASSISTANT EA 15: HCPCS

## 2020-02-11 PROCEDURE — 3331090002 HH PPS REVENUE DEBIT

## 2020-02-11 PROCEDURE — 3331090001 HH PPS REVENUE CREDIT

## 2020-02-12 ENCOUNTER — PATIENT OUTREACH (OUTPATIENT)
Dept: CASE MANAGEMENT | Age: 85
End: 2020-02-12

## 2020-02-12 VITALS
SYSTOLIC BLOOD PRESSURE: 128 MMHG | TEMPERATURE: 97.4 F | DIASTOLIC BLOOD PRESSURE: 60 MMHG | RESPIRATION RATE: 16 BRPM | HEART RATE: 72 BPM

## 2020-02-12 PROCEDURE — 3331090002 HH PPS REVENUE DEBIT

## 2020-02-12 PROCEDURE — 3331090001 HH PPS REVENUE CREDIT

## 2020-02-12 NOTE — PROGRESS NOTES
This note will not be viewable in [address] Transitions of Care  Follow up Outreach Note   Outreach type Phone call: spoke with patients wife  Home visit:   Date/Time of Outreach: 2/12/2020  1221pm     Has patient attended PCP or specialist follow-up appointments since last contact? What was outcome of appointment? When is next follow-up scheduled? Mrs. Jonatan De La Torre states patient is doing well, just taking a day at a time. Patient attended follow up as scheduled with next visit: Dr. Daniel Navarro 3/4/20; Dr. Elizabeth steven 5/8/20   Review medications. Any medication changes since last outreach? Does patient have any questions or issues related to their medications? Dr. Daniel Navarro restarted Lisinopril, atorvastatin and plavix      None stated. Home health active? If yes  any issue? Progress? Yes, [facility] [place], [facility] by [facility] remains active     Referrals needed?  (CM, SW, HH, etc. )   No.   Other issues/Miscellaneous? (Transportation, access to meals, ability to perform ADLs, adequate caregiver support, etc.) No other needs or concerns at this time. Mrs. Estrada states her gratitude for follow up. Next Outreach Scheduled?     Graduation from program?   N/A    Yes       Next Steps/Goals (if applicable):   N/A     Outreach completed by:   Myrtle Singh LPN  Care Coordinator

## 2020-02-13 ENCOUNTER — HOME CARE VISIT (OUTPATIENT)
Dept: SCHEDULING | Facility: HOME HEALTH | Age: 85
End: 2020-02-13
Payer: MEDICARE

## 2020-02-13 PROCEDURE — 3331090001 HH PPS REVENUE CREDIT

## 2020-02-13 PROCEDURE — 3331090002 HH PPS REVENUE DEBIT

## 2020-02-13 PROCEDURE — G0156 HHCP-SVS OF AIDE,EA 15 MIN: HCPCS

## 2020-02-13 PROCEDURE — G0157 HHC PT ASSISTANT EA 15: HCPCS

## 2020-02-14 VITALS
RESPIRATION RATE: 18 BRPM | OXYGEN SATURATION: 98 % | HEART RATE: 82 BPM | DIASTOLIC BLOOD PRESSURE: 70 MMHG | TEMPERATURE: 97.2 F | SYSTOLIC BLOOD PRESSURE: 130 MMHG

## 2020-02-14 VITALS
RESPIRATION RATE: 17 BRPM | SYSTOLIC BLOOD PRESSURE: 140 MMHG | HEART RATE: 87 BPM | DIASTOLIC BLOOD PRESSURE: 72 MMHG | TEMPERATURE: 96.8 F

## 2020-02-14 PROCEDURE — 3331090002 HH PPS REVENUE DEBIT

## 2020-02-14 PROCEDURE — 3331090001 HH PPS REVENUE CREDIT

## 2020-02-15 PROCEDURE — 3331090001 HH PPS REVENUE CREDIT

## 2020-02-15 PROCEDURE — 3331090002 HH PPS REVENUE DEBIT

## 2020-02-16 PROCEDURE — 3331090001 HH PPS REVENUE CREDIT

## 2020-02-16 PROCEDURE — 3331090002 HH PPS REVENUE DEBIT

## 2020-02-17 ENCOUNTER — HOME CARE VISIT (OUTPATIENT)
Dept: SCHEDULING | Facility: HOME HEALTH | Age: 85
End: 2020-02-17
Payer: MEDICARE

## 2020-02-17 ENCOUNTER — HOME CARE VISIT (OUTPATIENT)
Dept: HOME HEALTH SERVICES | Facility: HOME HEALTH | Age: 85
End: 2020-02-17
Payer: MEDICARE

## 2020-02-17 VITALS
SYSTOLIC BLOOD PRESSURE: 120 MMHG | HEART RATE: 72 BPM | DIASTOLIC BLOOD PRESSURE: 64 MMHG | TEMPERATURE: 97.4 F | RESPIRATION RATE: 18 BRPM

## 2020-02-17 PROCEDURE — G0158 HHC OT ASSISTANT EA 15: HCPCS

## 2020-02-17 PROCEDURE — 3331090002 HH PPS REVENUE DEBIT

## 2020-02-17 PROCEDURE — 3331090001 HH PPS REVENUE CREDIT

## 2020-02-18 PROCEDURE — 3331090001 HH PPS REVENUE CREDIT

## 2020-02-18 PROCEDURE — 3331090002 HH PPS REVENUE DEBIT

## 2020-02-19 ENCOUNTER — HOME CARE VISIT (OUTPATIENT)
Dept: SCHEDULING | Facility: HOME HEALTH | Age: 85
End: 2020-02-19
Payer: MEDICARE

## 2020-02-19 VITALS
SYSTOLIC BLOOD PRESSURE: 132 MMHG | DIASTOLIC BLOOD PRESSURE: 71 MMHG | HEART RATE: 60 BPM | RESPIRATION RATE: 17 BRPM | TEMPERATURE: 97.8 F

## 2020-02-19 PROCEDURE — G0151 HHCP-SERV OF PT,EA 15 MIN: HCPCS

## 2020-02-19 PROCEDURE — 3331090001 HH PPS REVENUE CREDIT

## 2020-02-19 PROCEDURE — G0156 HHCP-SVS OF AIDE,EA 15 MIN: HCPCS

## 2020-02-19 PROCEDURE — 3331090002 HH PPS REVENUE DEBIT

## 2020-02-20 VITALS
SYSTOLIC BLOOD PRESSURE: 140 MMHG | DIASTOLIC BLOOD PRESSURE: 62 MMHG | HEART RATE: 80 BPM | RESPIRATION RATE: 19 BRPM | TEMPERATURE: 98 F

## 2020-02-20 PROCEDURE — 3331090001 HH PPS REVENUE CREDIT

## 2020-02-20 PROCEDURE — 3331090002 HH PPS REVENUE DEBIT

## 2020-02-21 PROCEDURE — 3331090001 HH PPS REVENUE CREDIT

## 2020-02-21 PROCEDURE — 3331090002 HH PPS REVENUE DEBIT

## 2020-02-22 PROCEDURE — 3331090001 HH PPS REVENUE CREDIT

## 2020-02-22 PROCEDURE — 3331090002 HH PPS REVENUE DEBIT

## 2020-02-23 PROCEDURE — 3331090001 HH PPS REVENUE CREDIT

## 2020-02-23 PROCEDURE — 3331090002 HH PPS REVENUE DEBIT

## 2020-02-24 PROCEDURE — 3331090002 HH PPS REVENUE DEBIT

## 2020-02-24 PROCEDURE — 3331090001 HH PPS REVENUE CREDIT

## 2020-02-25 ENCOUNTER — HOME CARE VISIT (OUTPATIENT)
Dept: SCHEDULING | Facility: HOME HEALTH | Age: 85
End: 2020-02-25
Payer: MEDICARE

## 2020-02-25 VITALS
SYSTOLIC BLOOD PRESSURE: 135 MMHG | DIASTOLIC BLOOD PRESSURE: 70 MMHG | RESPIRATION RATE: 18 BRPM | TEMPERATURE: 98.3 F | HEART RATE: 77 BPM

## 2020-02-25 VITALS
OXYGEN SATURATION: 99 % | SYSTOLIC BLOOD PRESSURE: 138 MMHG | TEMPERATURE: 98.1 F | DIASTOLIC BLOOD PRESSURE: 74 MMHG | HEART RATE: 72 BPM | RESPIRATION RATE: 18 BRPM

## 2020-02-25 PROCEDURE — G0155 HHCP-SVS OF CSW,EA 15 MIN: HCPCS

## 2020-02-25 PROCEDURE — 3331090002 HH PPS REVENUE DEBIT

## 2020-02-25 PROCEDURE — G0152 HHCP-SERV OF OT,EA 15 MIN: HCPCS

## 2020-02-25 PROCEDURE — G0153 HHCP-SVS OF S/L PATH,EA 15MN: HCPCS

## 2020-02-25 PROCEDURE — 3331090001 HH PPS REVENUE CREDIT

## 2020-02-26 ENCOUNTER — HOME CARE VISIT (OUTPATIENT)
Dept: SCHEDULING | Facility: HOME HEALTH | Age: 85
End: 2020-02-26
Payer: MEDICARE

## 2020-02-26 VITALS
DIASTOLIC BLOOD PRESSURE: 64 MMHG | TEMPERATURE: 96.8 F | RESPIRATION RATE: 16 BRPM | HEART RATE: 76 BPM | SYSTOLIC BLOOD PRESSURE: 118 MMHG

## 2020-02-26 PROCEDURE — 3331090001 HH PPS REVENUE CREDIT

## 2020-02-26 PROCEDURE — G0157 HHC PT ASSISTANT EA 15: HCPCS

## 2020-02-26 PROCEDURE — 3331090002 HH PPS REVENUE DEBIT

## 2020-02-27 PROCEDURE — 3331090002 HH PPS REVENUE DEBIT

## 2020-02-27 PROCEDURE — 3331090001 HH PPS REVENUE CREDIT

## 2020-02-28 ENCOUNTER — HOME CARE VISIT (OUTPATIENT)
Dept: SCHEDULING | Facility: HOME HEALTH | Age: 85
End: 2020-02-28
Payer: MEDICARE

## 2020-02-28 VITALS
SYSTOLIC BLOOD PRESSURE: 120 MMHG | DIASTOLIC BLOOD PRESSURE: 64 MMHG | RESPIRATION RATE: 18 BRPM | HEART RATE: 79 BPM | TEMPERATURE: 98 F

## 2020-02-28 PROCEDURE — 3331090002 HH PPS REVENUE DEBIT

## 2020-02-28 PROCEDURE — G0158 HHC OT ASSISTANT EA 15: HCPCS

## 2020-02-28 PROCEDURE — 3331090001 HH PPS REVENUE CREDIT

## 2020-02-28 PROCEDURE — G0157 HHC PT ASSISTANT EA 15: HCPCS

## 2020-02-28 PROCEDURE — 400013 HH SOC

## 2020-02-29 VITALS
RESPIRATION RATE: 18 BRPM | SYSTOLIC BLOOD PRESSURE: 120 MMHG | TEMPERATURE: 98 F | HEART RATE: 79 BPM | DIASTOLIC BLOOD PRESSURE: 64 MMHG

## 2020-02-29 PROCEDURE — 3331090001 HH PPS REVENUE CREDIT

## 2020-02-29 PROCEDURE — 3331090002 HH PPS REVENUE DEBIT

## 2020-03-01 PROCEDURE — 3331090002 HH PPS REVENUE DEBIT

## 2020-03-01 PROCEDURE — 3331090001 HH PPS REVENUE CREDIT

## 2020-03-02 PROCEDURE — 3331090001 HH PPS REVENUE CREDIT

## 2020-03-02 PROCEDURE — 3331090002 HH PPS REVENUE DEBIT

## 2020-03-03 ENCOUNTER — HOME CARE VISIT (OUTPATIENT)
Dept: SCHEDULING | Facility: HOME HEALTH | Age: 85
End: 2020-03-03
Payer: MEDICARE

## 2020-03-03 VITALS
DIASTOLIC BLOOD PRESSURE: 58 MMHG | HEART RATE: 84 BPM | RESPIRATION RATE: 18 BRPM | SYSTOLIC BLOOD PRESSURE: 102 MMHG | TEMPERATURE: 98.4 F

## 2020-03-03 PROCEDURE — 3331090001 HH PPS REVENUE CREDIT

## 2020-03-03 PROCEDURE — 3331090002 HH PPS REVENUE DEBIT

## 2020-03-03 PROCEDURE — G0158 HHC OT ASSISTANT EA 15: HCPCS

## 2020-03-04 ENCOUNTER — HOME CARE VISIT (OUTPATIENT)
Dept: SCHEDULING | Facility: HOME HEALTH | Age: 85
End: 2020-03-04
Payer: MEDICARE

## 2020-03-04 PROCEDURE — 3331090001 HH PPS REVENUE CREDIT

## 2020-03-04 PROCEDURE — G0157 HHC PT ASSISTANT EA 15: HCPCS

## 2020-03-04 PROCEDURE — 3331090002 HH PPS REVENUE DEBIT

## 2020-03-05 ENCOUNTER — HOME CARE VISIT (OUTPATIENT)
Dept: SCHEDULING | Facility: HOME HEALTH | Age: 85
End: 2020-03-05
Payer: MEDICARE

## 2020-03-05 VITALS
TEMPERATURE: 97.7 F | HEART RATE: 68 BPM | DIASTOLIC BLOOD PRESSURE: 70 MMHG | SYSTOLIC BLOOD PRESSURE: 128 MMHG | RESPIRATION RATE: 19 BRPM

## 2020-03-05 VITALS
HEART RATE: 88 BPM | SYSTOLIC BLOOD PRESSURE: 142 MMHG | TEMPERATURE: 98.2 F | DIASTOLIC BLOOD PRESSURE: 80 MMHG | OXYGEN SATURATION: 97 % | RESPIRATION RATE: 18 BRPM

## 2020-03-05 PROCEDURE — 3331090002 HH PPS REVENUE DEBIT

## 2020-03-05 PROCEDURE — 3331090001 HH PPS REVENUE CREDIT

## 2020-03-05 PROCEDURE — G0153 HHCP-SVS OF S/L PATH,EA 15MN: HCPCS

## 2020-03-05 PROCEDURE — G0158 HHC OT ASSISTANT EA 15: HCPCS

## 2020-03-06 ENCOUNTER — HOME CARE VISIT (OUTPATIENT)
Dept: SCHEDULING | Facility: HOME HEALTH | Age: 85
End: 2020-03-06
Payer: MEDICARE

## 2020-03-06 PROCEDURE — 3331090002 HH PPS REVENUE DEBIT

## 2020-03-06 PROCEDURE — G0157 HHC PT ASSISTANT EA 15: HCPCS

## 2020-03-06 PROCEDURE — 3331090001 HH PPS REVENUE CREDIT

## 2020-03-07 PROCEDURE — 3331090001 HH PPS REVENUE CREDIT

## 2020-03-07 PROCEDURE — 3331090002 HH PPS REVENUE DEBIT

## 2020-03-08 VITALS
RESPIRATION RATE: 16 BRPM | HEART RATE: 72 BPM | SYSTOLIC BLOOD PRESSURE: 124 MMHG | DIASTOLIC BLOOD PRESSURE: 70 MMHG | TEMPERATURE: 97.2 F

## 2020-03-08 VITALS
TEMPERATURE: 97 F | SYSTOLIC BLOOD PRESSURE: 120 MMHG | HEART RATE: 74 BPM | DIASTOLIC BLOOD PRESSURE: 70 MMHG | RESPIRATION RATE: 16 BRPM

## 2020-03-08 PROCEDURE — 3331090001 HH PPS REVENUE CREDIT

## 2020-03-08 PROCEDURE — 3331090002 HH PPS REVENUE DEBIT

## 2020-03-09 ENCOUNTER — HOME CARE VISIT (OUTPATIENT)
Dept: SCHEDULING | Facility: HOME HEALTH | Age: 85
End: 2020-03-09
Payer: MEDICARE

## 2020-03-09 VITALS
SYSTOLIC BLOOD PRESSURE: 122 MMHG | OXYGEN SATURATION: 98 % | DIASTOLIC BLOOD PRESSURE: 66 MMHG | TEMPERATURE: 97.8 F | RESPIRATION RATE: 18 BRPM | HEART RATE: 68 BPM

## 2020-03-09 PROCEDURE — G0157 HHC PT ASSISTANT EA 15: HCPCS

## 2020-03-09 PROCEDURE — G0153 HHCP-SVS OF S/L PATH,EA 15MN: HCPCS

## 2020-03-09 PROCEDURE — 3331090002 HH PPS REVENUE DEBIT

## 2020-03-09 PROCEDURE — 3331090001 HH PPS REVENUE CREDIT

## 2020-03-10 ENCOUNTER — HOME CARE VISIT (OUTPATIENT)
Dept: SCHEDULING | Facility: HOME HEALTH | Age: 85
End: 2020-03-10
Payer: MEDICARE

## 2020-03-10 ENCOUNTER — HOME CARE VISIT (OUTPATIENT)
Dept: HOME HEALTH SERVICES | Facility: HOME HEALTH | Age: 85
End: 2020-03-10
Payer: MEDICARE

## 2020-03-10 VITALS
HEART RATE: 68 BPM | DIASTOLIC BLOOD PRESSURE: 66 MMHG | RESPIRATION RATE: 16 BRPM | SYSTOLIC BLOOD PRESSURE: 122 MMHG | TEMPERATURE: 97.8 F

## 2020-03-10 VITALS
HEART RATE: 75 BPM | RESPIRATION RATE: 17 BRPM | TEMPERATURE: 98 F | DIASTOLIC BLOOD PRESSURE: 68 MMHG | SYSTOLIC BLOOD PRESSURE: 122 MMHG

## 2020-03-10 PROCEDURE — 3331090001 HH PPS REVENUE CREDIT

## 2020-03-10 PROCEDURE — 3331090002 HH PPS REVENUE DEBIT

## 2020-03-10 PROCEDURE — G0158 HHC OT ASSISTANT EA 15: HCPCS

## 2020-03-11 ENCOUNTER — HOME CARE VISIT (OUTPATIENT)
Dept: SCHEDULING | Facility: HOME HEALTH | Age: 85
End: 2020-03-11
Payer: MEDICARE

## 2020-03-11 VITALS
TEMPERATURE: 98.2 F | DIASTOLIC BLOOD PRESSURE: 78 MMHG | HEART RATE: 77 BPM | SYSTOLIC BLOOD PRESSURE: 128 MMHG | RESPIRATION RATE: 17 BRPM

## 2020-03-11 VITALS
SYSTOLIC BLOOD PRESSURE: 116 MMHG | TEMPERATURE: 98.1 F | DIASTOLIC BLOOD PRESSURE: 66 MMHG | RESPIRATION RATE: 18 BRPM | HEART RATE: 78 BPM | OXYGEN SATURATION: 98 %

## 2020-03-11 PROCEDURE — G0153 HHCP-SVS OF S/L PATH,EA 15MN: HCPCS

## 2020-03-11 PROCEDURE — G0152 HHCP-SERV OF OT,EA 15 MIN: HCPCS

## 2020-03-11 PROCEDURE — 3331090002 HH PPS REVENUE DEBIT

## 2020-03-11 PROCEDURE — 3331090001 HH PPS REVENUE CREDIT

## 2020-03-12 ENCOUNTER — HOME CARE VISIT (OUTPATIENT)
Dept: SCHEDULING | Facility: HOME HEALTH | Age: 85
End: 2020-03-12
Payer: MEDICARE

## 2020-03-12 VITALS
RESPIRATION RATE: 17 BRPM | DIASTOLIC BLOOD PRESSURE: 72 MMHG | TEMPERATURE: 97.3 F | HEART RATE: 68 BPM | SYSTOLIC BLOOD PRESSURE: 131 MMHG

## 2020-03-12 PROCEDURE — 3331090001 HH PPS REVENUE CREDIT

## 2020-03-12 PROCEDURE — 3331090002 HH PPS REVENUE DEBIT

## 2020-03-12 PROCEDURE — G0151 HHCP-SERV OF PT,EA 15 MIN: HCPCS

## 2020-03-13 PROCEDURE — 3331090001 HH PPS REVENUE CREDIT

## 2020-03-13 PROCEDURE — 3331090002 HH PPS REVENUE DEBIT

## 2020-03-14 PROCEDURE — 3331090002 HH PPS REVENUE DEBIT

## 2020-03-14 PROCEDURE — 3331090001 HH PPS REVENUE CREDIT

## 2020-03-15 PROCEDURE — 3331090002 HH PPS REVENUE DEBIT

## 2020-03-15 PROCEDURE — 3331090001 HH PPS REVENUE CREDIT

## 2020-03-16 PROCEDURE — 3331090002 HH PPS REVENUE DEBIT

## 2020-03-16 PROCEDURE — 3331090001 HH PPS REVENUE CREDIT

## 2020-03-17 PROCEDURE — 3331090001 HH PPS REVENUE CREDIT

## 2020-03-17 PROCEDURE — 3331090002 HH PPS REVENUE DEBIT

## 2020-03-18 ENCOUNTER — HOME CARE VISIT (OUTPATIENT)
Dept: SCHEDULING | Facility: HOME HEALTH | Age: 85
End: 2020-03-18
Payer: MEDICARE

## 2020-03-18 VITALS
DIASTOLIC BLOOD PRESSURE: 66 MMHG | OXYGEN SATURATION: 97 % | HEART RATE: 78 BPM | SYSTOLIC BLOOD PRESSURE: 118 MMHG | RESPIRATION RATE: 16 BRPM | TEMPERATURE: 98.1 F

## 2020-03-18 PROCEDURE — 3331090002 HH PPS REVENUE DEBIT

## 2020-03-18 PROCEDURE — G0153 HHCP-SVS OF S/L PATH,EA 15MN: HCPCS

## 2020-03-18 PROCEDURE — 3331090001 HH PPS REVENUE CREDIT

## 2021-08-03 PROBLEM — I25.10 CAD (CORONARY ARTERY DISEASE): Status: RESOLVED | Noted: 2020-01-26 | Resolved: 2021-08-03
